# Patient Record
Sex: FEMALE | Race: WHITE | Employment: UNEMPLOYED | URBAN - NONMETROPOLITAN AREA
[De-identification: names, ages, dates, MRNs, and addresses within clinical notes are randomized per-mention and may not be internally consistent; named-entity substitution may affect disease eponyms.]

---

## 2017-01-26 ENCOUNTER — HOSPITAL ENCOUNTER (OUTPATIENT)
Dept: OTHER | Age: 42
Discharge: HOME | End: 2017-01-31
Attending: PREVENTIVE MEDICINE | Admitting: PREVENTIVE MEDICINE

## 2017-02-01 ENCOUNTER — HOSPITAL ENCOUNTER (OUTPATIENT)
Dept: OTHER | Age: 42
Discharge: HOME | End: 2017-02-28
Attending: PREVENTIVE MEDICINE | Admitting: PREVENTIVE MEDICINE

## 2021-10-08 ENCOUNTER — HOSPITAL ENCOUNTER (EMERGENCY)
Age: 46
Discharge: HOME | End: 2021-10-08
Attending: EMERGENCY MEDICINE
Payer: COMMERCIAL

## 2021-10-08 VITALS
HEIGHT: 68 IN | RESPIRATION RATE: 16 BRPM | SYSTOLIC BLOOD PRESSURE: 131 MMHG | BODY MASS INDEX: 24.25 KG/M2 | HEART RATE: 111 BPM | OXYGEN SATURATION: 100 % | WEIGHT: 160 LBS | DIASTOLIC BLOOD PRESSURE: 95 MMHG | TEMPERATURE: 97.8 F

## 2021-10-08 DIAGNOSIS — N72 CERVICITIS: ICD-10-CM

## 2021-10-08 DIAGNOSIS — R10.30 LOWER ABDOMINAL PAIN: ICD-10-CM

## 2021-10-08 DIAGNOSIS — Z71.1 CONCERN ABOUT STD IN FEMALE WITHOUT DIAGNOSIS: Primary | ICD-10-CM

## 2021-10-08 LAB
ALBUMIN SERPL-MCNC: 3.9 GM/DL (ref 3.4–5)
ALP BLD-CCNC: 73 IU/L (ref 40–129)
ALT SERPL-CCNC: 10 U/L (ref 10–40)
ANION GAP SERPL CALCULATED.3IONS-SCNC: 14 MMOL/L (ref 4–16)
AST SERPL-CCNC: 25 IU/L (ref 15–37)
BACTERIA: ABNORMAL /HPF
BASOPHILS ABSOLUTE: 0 K/CU MM
BASOPHILS RELATIVE PERCENT: 0.2 % (ref 0–1)
BILIRUB SERPL-MCNC: 0.5 MG/DL (ref 0–1)
BILIRUBIN DIRECT: 0.2 MG/DL (ref 0–0.3)
BILIRUBIN URINE: NEGATIVE MG/DL
BILIRUBIN, INDIRECT: 0.3 MG/DL (ref 0–0.7)
BLOOD, URINE: ABNORMAL
BUN BLDV-MCNC: 14 MG/DL (ref 6–23)
CALCIUM SERPL-MCNC: 9 MG/DL (ref 8.3–10.6)
CAST TYPE: ABNORMAL /HPF
CHLORIDE BLD-SCNC: 100 MMOL/L (ref 99–110)
CLARITY: ABNORMAL
CO2: 21 MMOL/L (ref 21–32)
COLOR: YELLOW
COMMENT UA: ABNORMAL
CREAT SERPL-MCNC: 0.8 MG/DL (ref 0.6–1.1)
CRYSTAL TYPE: NEGATIVE /HPF
DIFFERENTIAL TYPE: ABNORMAL
EOSINOPHILS ABSOLUTE: 0.1 K/CU MM
EOSINOPHILS RELATIVE PERCENT: 0.6 % (ref 0–3)
EPITHELIAL CELLS, UA: ABNORMAL /HPF
GFR AFRICAN AMERICAN: >60 ML/MIN/1.73M2
GFR NON-AFRICAN AMERICAN: >60 ML/MIN/1.73M2
GLUCOSE BLD-MCNC: 100 MG/DL (ref 70–99)
GLUCOSE, URINE: NEGATIVE MG/DL
HCT VFR BLD CALC: 36.2 % (ref 37–47)
HEMOGLOBIN: 10.7 GM/DL (ref 12.5–16)
IMMATURE NEUTROPHIL %: 0.5 % (ref 0–0.43)
INTERPRETATION: NORMAL
KETONES, URINE: 160 MG/DL
LEUKOCYTE ESTERASE, URINE: ABNORMAL
LIPASE: 27 IU/L (ref 13–60)
LYMPHOCYTES ABSOLUTE: 0.9 K/CU MM
LYMPHOCYTES RELATIVE PERCENT: 7.4 % (ref 24–44)
Lab: NORMAL
MCH RBC QN AUTO: 23.3 PG (ref 27–31)
MCHC RBC AUTO-ENTMCNC: 29.6 % (ref 32–36)
MCV RBC AUTO: 78.9 FL (ref 78–100)
MONOCYTES ABSOLUTE: 0.8 K/CU MM
MONOCYTES RELATIVE PERCENT: 6.9 % (ref 0–4)
NITRITE URINE, QUANTITATIVE: NEGATIVE
PDW BLD-RTO: 15.3 % (ref 11.7–14.9)
PH, URINE: 6 (ref 5–8)
PLATELET # BLD: 230 K/CU MM (ref 140–440)
PMV BLD AUTO: 10.5 FL (ref 7.5–11.1)
POTASSIUM SERPL-SCNC: 4.9 MMOL/L (ref 3.5–5.1)
PREGNANCY, URINE: NEGATIVE
PROTEIN UA: 100 MG/DL
RBC # BLD: 4.59 M/CU MM (ref 4.2–5.4)
RBC URINE: 1 /HPF (ref 0–6)
SEGMENTED NEUTROPHILS ABSOLUTE COUNT: 10.2 K/CU MM
SEGMENTED NEUTROPHILS RELATIVE PERCENT: 84.4 % (ref 36–66)
SODIUM BLD-SCNC: 135 MMOL/L (ref 135–145)
SPECIFIC GRAVITY UA: 1.02 (ref 1–1.03)
SPECIFIC GRAVITY, URINE: 1.02 (ref 1–1.03)
SPECIMEN: NORMAL
TOTAL IMMATURE NEUTOROPHIL: 0.06 K/CU MM
TOTAL PROTEIN: 7 GM/DL (ref 6.4–8.2)
UROBILINOGEN, URINE: 0.2 MG/DL (ref 0.2–1)
WBC # BLD: 12.1 K/CU MM (ref 4–10.5)
WBC UA: 20 /HPF (ref 0–5)
WET PREP: NORMAL

## 2021-10-08 PROCEDURE — 87491 CHLMYD TRACH DNA AMP PROBE: CPT

## 2021-10-08 PROCEDURE — 80053 COMPREHEN METABOLIC PANEL: CPT

## 2021-10-08 PROCEDURE — 6360000002 HC RX W HCPCS: Performed by: EMERGENCY MEDICINE

## 2021-10-08 PROCEDURE — 85025 COMPLETE CBC W/AUTO DIFF WBC: CPT

## 2021-10-08 PROCEDURE — 81001 URINALYSIS AUTO W/SCOPE: CPT

## 2021-10-08 PROCEDURE — 83690 ASSAY OF LIPASE: CPT

## 2021-10-08 PROCEDURE — 99283 EMERGENCY DEPT VISIT LOW MDM: CPT

## 2021-10-08 PROCEDURE — 96372 THER/PROPH/DIAG INJ SC/IM: CPT

## 2021-10-08 PROCEDURE — 81025 URINE PREGNANCY TEST: CPT

## 2021-10-08 PROCEDURE — 82248 BILIRUBIN DIRECT: CPT

## 2021-10-08 PROCEDURE — 87210 SMEAR WET MOUNT SALINE/INK: CPT

## 2021-10-08 PROCEDURE — 87591 N.GONORRHOEAE DNA AMP PROB: CPT

## 2021-10-08 PROCEDURE — 6370000000 HC RX 637 (ALT 250 FOR IP): Performed by: EMERGENCY MEDICINE

## 2021-10-08 RX ORDER — METRONIDAZOLE 250 MG/1
500 TABLET ORAL ONCE
Status: COMPLETED | OUTPATIENT
Start: 2021-10-08 | End: 2021-10-08

## 2021-10-08 RX ORDER — ONDANSETRON 2 MG/ML
4 INJECTION INTRAMUSCULAR; INTRAVENOUS ONCE
Status: DISCONTINUED | OUTPATIENT
Start: 2021-10-08 | End: 2021-10-08

## 2021-10-08 RX ORDER — ONDANSETRON 4 MG/1
4 TABLET, ORALLY DISINTEGRATING ORAL EVERY 8 HOURS PRN
Qty: 15 TABLET | Refills: 0 | Status: SHIPPED | OUTPATIENT
Start: 2021-10-08 | End: 2022-08-12

## 2021-10-08 RX ORDER — ONDANSETRON 4 MG/1
4 TABLET, ORALLY DISINTEGRATING ORAL ONCE
Status: COMPLETED | OUTPATIENT
Start: 2021-10-08 | End: 2021-10-08

## 2021-10-08 RX ORDER — HYDROCODONE BITARTRATE AND ACETAMINOPHEN 5; 325 MG/1; MG/1
1 TABLET ORAL ONCE
Status: COMPLETED | OUTPATIENT
Start: 2021-10-08 | End: 2021-10-08

## 2021-10-08 RX ORDER — DOXYCYCLINE HYCLATE 100 MG
100 TABLET ORAL ONCE
Status: COMPLETED | OUTPATIENT
Start: 2021-10-08 | End: 2021-10-08

## 2021-10-08 RX ORDER — METRONIDAZOLE 500 MG/1
500 TABLET ORAL 2 TIMES DAILY
Qty: 14 TABLET | Refills: 0 | Status: SHIPPED | OUTPATIENT
Start: 2021-10-08 | End: 2021-10-15

## 2021-10-08 RX ORDER — KETOROLAC TROMETHAMINE 30 MG/ML
30 INJECTION, SOLUTION INTRAMUSCULAR; INTRAVENOUS ONCE
Status: DISCONTINUED | OUTPATIENT
Start: 2021-10-08 | End: 2021-10-08

## 2021-10-08 RX ORDER — DOXYCYCLINE HYCLATE 100 MG
100 TABLET ORAL 2 TIMES DAILY
Qty: 14 TABLET | Refills: 0 | Status: SHIPPED | OUTPATIENT
Start: 2021-10-08 | End: 2021-10-15

## 2021-10-08 RX ORDER — CEFTRIAXONE 1 G/1
500 INJECTION, POWDER, FOR SOLUTION INTRAMUSCULAR; INTRAVENOUS ONCE
Status: COMPLETED | OUTPATIENT
Start: 2021-10-08 | End: 2021-10-08

## 2021-10-08 RX ORDER — IBUPROFEN 600 MG/1
600 TABLET ORAL 3 TIMES DAILY PRN
Qty: 30 TABLET | Refills: 0 | Status: SHIPPED | OUTPATIENT
Start: 2021-10-08 | End: 2022-08-12

## 2021-10-08 RX ORDER — MORPHINE SULFATE 4 MG/ML
4 INJECTION, SOLUTION INTRAMUSCULAR; INTRAVENOUS ONCE
Status: DISCONTINUED | OUTPATIENT
Start: 2021-10-08 | End: 2021-10-08

## 2021-10-08 RX ORDER — IBUPROFEN 400 MG/1
800 TABLET ORAL ONCE
Status: COMPLETED | OUTPATIENT
Start: 2021-10-08 | End: 2021-10-08

## 2021-10-08 RX ORDER — HYDROCODONE BITARTRATE AND ACETAMINOPHEN 5; 325 MG/1; MG/1
1 TABLET ORAL EVERY 6 HOURS PRN
Qty: 12 TABLET | Refills: 0 | Status: SHIPPED | OUTPATIENT
Start: 2021-10-08 | End: 2021-10-11

## 2021-10-08 RX ORDER — 0.9 % SODIUM CHLORIDE 0.9 %
1000 INTRAVENOUS SOLUTION INTRAVENOUS ONCE
Status: DISCONTINUED | OUTPATIENT
Start: 2021-10-08 | End: 2021-10-08

## 2021-10-08 RX ADMIN — IBUPROFEN 800 MG: 400 TABLET, FILM COATED ORAL at 12:10

## 2021-10-08 RX ADMIN — CEFTRIAXONE SODIUM 500 MG: 1 INJECTION, POWDER, FOR SOLUTION INTRAMUSCULAR; INTRAVENOUS at 13:31

## 2021-10-08 RX ADMIN — ONDANSETRON 4 MG: 4 TABLET, ORALLY DISINTEGRATING ORAL at 12:10

## 2021-10-08 RX ADMIN — HYDROCODONE BITARTRATE AND ACETAMINOPHEN 1 TABLET: 5; 325 TABLET ORAL at 13:42

## 2021-10-08 RX ADMIN — METRONIDAZOLE 500 MG: 250 TABLET ORAL at 13:31

## 2021-10-08 RX ADMIN — DOXYCYCLINE HYCLATE 100 MG: 100 TABLET, COATED ORAL at 13:31

## 2021-10-08 ASSESSMENT — PAIN SCALES - GENERAL: PAINLEVEL_OUTOF10: 7

## 2021-10-08 NOTE — ED NOTES
Pt presents to ED with c/o abd pain and dysuria that started yesterday. Pt states boyfriend recently cheated on her. Pt states she noticed some vaginal discharge this morning.      Kasia Lofton RN  10/08/21 1514

## 2021-10-08 NOTE — ED NOTES
Discharge instructions given with prescription verbalized understanding pt is ambulatory out       Reina Deleon RN  10/08/21 7496

## 2021-10-08 NOTE — ED PROVIDER NOTES
CHIEF COMPLAINT  Chief Complaint   Patient presents with    Abdominal Pain    Dysuria    Exposure to STD       HPI  Kimberly Oviedo is a 42 y.o. female with history of hypertension, CHF who presents lower abdominal pain that has worsened over the past 3 to 4 days but today is more severe, significant, well localized across bilateral lower quadrants. She is also having pelvic discharge and recently found out that her significant other cheated on her. She is having menstrual cycles, denies pregnancy. She is also having urinary frequency, no hematuria or history of renal calculi. Denies any measured fevers but has had some chills and fatigue. She has been taking over-the-counter medications intermittently with minimal improvement.   Last dose 6 hours prior to arrival.    REVIEW OF SYSTEMS  Review of Systems   History obtained from chart review and the patient  General ROS: positive for  - chills and fatigue  Ophthalmic ROS: negative for - decreased vision or double vision  ENT ROS: negative for - headaches  Hematological and Lymphatic ROS: negative for - bleeding problems or bruising  Endocrine ROS: negative for - unexpected weight changes  Respiratory ROS: no cough, shortness of breath, or wheezing  Cardiovascular ROS: no chest pain or dyspnea on exertion  Gastrointestinal ROS: positive for - abdominal pain  Genito-Urinary ROS: positive for -pelvic discharge  Musculoskeletal ROS: negative for - joint pain or joint stiffness  Neurological ROS: no TIA or stroke symptoms      PAST MEDICAL HISTORY  Past Medical History:   Diagnosis Date    CHF [facility]congest[facility] failure) ([facility] [address]     Depression     reports prior hx but no sx at present    Hypertension     diet controlled    Insomnia 5/7/2015       FAMILY HISTORY  Family History   Problem Relation Age of Onset    High Blood Pressure Mother     High Blood Pressure Father     High Blood Pressure Sister     Breast Cancer Neg Hx     Ovarian Cancer Neg Hx     Colon Cancer Neg Hx     Heart Disease Neg Hx        SOCIAL HISTORY  Social History     Socioeconomic History    Marital status: Single     Spouse name: Not on file    Number of children: Not on file    Years of education: Not on file    Highest education level: Not on file   Occupational History    Not on file   Tobacco Use    Smoking status: Current Every Day Smoker     Packs/day: 0.50     Types: Cigarettes    Smokeless tobacco: Never Used   Vaping Use    Vaping Use: Never used   Substance and Sexual Activity    Alcohol use: No     Alcohol/week: 0.0 standard drinks     Comment: rare    Drug use: No    Sexual activity: Not on file   Other Topics Concern    Not on file   Social History Narrative    Not on file     Social Determinants of Health     Financial Resource Strain:     Difficulty of Paying Living Expenses:    Food Insecurity:     Worried About Running Out of Food in the Last Year:     Ran Out of Food in the Last Year:    Transportation Needs:     Lack of Transportation (Medical):  Lack of Transportation (Non-Medical):    Physical Activity:     Days of Exercise per Week:     Minutes of Exercise per Session:    Stress:     Feeling of Stress :    Social Connections:     Frequency of Communication with Friends and Family:     Frequency of Social Gatherings with Friends and Family:     Attends Cheondo[employer] Services:     Active Member of Clubs or Organizations:     Attends Club or Organization Meetings:     Marital Status:    Intimate Partner Violence:     Fear of Current or Ex-Partner:     Emotionally Abused:     Physically Abused:     Sexually Abused:        SURGICAL HISTORY  Past Surgical History:   Procedure Laterality Date    [address]  No current facility-administered medications on file prior to encounter.      Current Outpatient Medications on File Prior to Encounter   Medication Sig Dispense Refill    clonazePAM (KLONOPIN) 0.5 MG tablet Take one tab in the morning and two tabs at night 90 tablet 2    cloNIDine (CATAPRES) 0.2 MG tablet Take 1 tablet by mouth 2 times daily 60 tablet 7    lisinopril (PRINIVIL;ZESTRIL) 10 MG tablet Take 1 tablet by mouth daily 30 tablet 7    amitriptyline (ELAVIL) 75 MG tablet Take 1 tablet by mouth nightly 30 tablet 5    albuterol (VENTOLIN HFA) 108 (90 BASE) MCG/ACT inhaler Inhale 2 puffs into the lungs every 6 hours as needed for Wheezing 1 Inhaler 3         ALLERGIES  No Known Allergies    PHYSICAL EXAM  VITAL SIGNS: BP (!) 131/95   Pulse 111 Comment: pt crying during vital signs measument  Temp 97.8 °F (36.6 °C) (Infrared)   Resp 16   Ht 5' 8\" (1.727 m)   Wt 160 lb (72.6 kg)   SpO2 100%   BMI 24.33 kg/m²   Constitutional: Well developed, Well nourished, resting in bed, appears uncomfortable  HENT: Normocephalic, Atraumatic, Bilateral external ears normal, Oropharynx moist, No oral exudates, Nose normal.   Eyes: PERRL, EOMI, Conjunctiva normal, No discharge. Neck: Normal range of motion, Supple, No stridor. Cardiovascular: Tachycardic heart rate, Normal rhythm, No murmurs, No rubs, No gallops. Thorax & Lungs: Normal breath sounds, No respiratory distress, No wheezing, No chest tenderness. Abdomen: Bowel sounds normal, Soft, bilateral lower and suprapubic tenderness, intermittent voluntary lower guarding, no rebound, No masses, No pulsatile masses. Skin: Warm, Dry, No erythema, No rash. Extremities: Intact distal pulses, No edema, No tenderness, No cyanosis, No clubbing. Musculoskeletal: Good gross range of motion in all major joints. No major deformities noted. Neurologic: Alert & oriented x 3, Normal gross motor function, Normal gross sensory function, No focal deficits noted. Psychiatric: Affect normal  : Performed in conjunction with nursing chaperone.   Normal external genitalia, copious yellow/white discharge at the cervical os. Positive CMT and adnexal tenderness. RADIOLOGY/PROCEDURES/LABS      Labs Reviewed   CBC WITH AUTO DIFFERENTIAL - Abnormal; Notable for the following components:       Result Value    WBC 12.1 (*)     Hemoglobin 10.7 (*)     Hematocrit 36.2 (*)     MCH 23.3 (*)     MCHC 29.6 (*)     RDW 15.3 (*)     Segs Relative 84.4 (*)     Lymphocytes % 7.4 (*)     Monocytes % 6.9 (*)     Immature Neutrophil % 0.5 (*)     All other components within normal limits   BASIC METABOLIC PANEL W/ REFLEX TO MG FOR LOW K - Abnormal; Notable for the following components:    Glucose 100 (*)     All other components within normal limits   URINALYSIS - Abnormal; Notable for the following components:    Clarity, UA CLOUDY (*)     Ketones, Urine 160 (*)     Blood, Urine TRACE (*)     Protein,  (*)     Leukocyte Esterase, Urine MODERATE (*)     WBC, UA 20 (*)     Bacteria, UA FEW (*)     All other components within normal limits   WET PREP, GENITAL   C.TRACHOMATIS N.GONORRHOEAE DNA   HEPATIC FUNCTION PANEL   LIPASE   PREGNANCY, URINE     UA likely contaminated secondary to copious vaginal secretions, explaining clue cells    Medications   ondansetron (ZOFRAN-ODT) disintegrating tablet 4 mg (4 mg Oral Given 10/8/21 1210)   ibuprofen (ADVIL;MOTRIN) tablet 800 mg (800 mg Oral Given 10/8/21 1210)   doxycycline hyclate (VIBRA-TABS) tablet 100 mg (100 mg Oral Given 10/8/21 1331)   cefTRIAXone (ROCEPHIN) injection 500 mg (500 mg IntraMUSCular Given 10/8/21 1331)   metroNIDAZOLE (FLAGYL) tablet 500 mg (500 mg Oral Given 10/8/21 1331)   HYDROcodone-acetaminophen (NORCO) 5-325 MG per tablet 1 tablet (1 tablet Oral Given 10/8/21 1342)       COURSE & MEDICAL DECISION MAKING  Pertinent Labs & Imaging studies reviewed. (See chart for details)    43-year-old female presents with vaginal discharge, lower abdominal pain.   I have concern that she may have PID secondary to the suggestion of cervicitis with lower abdominal pain. Patient states that she is always tachycardic and that her normal heart rate is in the 100s or 110s. Here she is tachycardic but states that her normal.  She is afebrile, tolerating p.o. Her pain was managed with ibuprofen after Zofran. She had continued pain and Norco was given. She will be treated for PID and STDs with Rocephin, doxycycline and Flagyl. The doxycycline and Flagyl to continue as an outpatient. She is agreeable plan of care, continued nausea medication recommended. She will be referred to gynecology. Very strict return cautions put in place secondary to her degree of illness and persistent tachycardia. Lower clinical suspicion for appendicitis, colitis, diverticulitis, ectopic pregnancy, ovarian torsion, pyelonephritis as cause of signs and symptoms. FINAL IMPRESSION  Problem List Items Addressed This Visit     None      Visit Diagnoses     Concern about STD in female without diagnosis    -  Primary    Cervicitis        Relevant Medications    HYDROcodone-acetaminophen (NORCO) 5-325 MG per tablet    Lower abdominal pain          1.    2.   3.    Patient gave me permission to discuss medical history, care, and plan with those present in the room.   Electronically signed by: Tejas Pradhan MD, 10/8/2021  MD Tejas Melgar MD  10/08/21 5334

## 2021-10-08 NOTE — Clinical Note
Tejas Louis was seen and treated in our emergency department on 10/8/2021. She may return to work on 10/11/2021. If you have any questions or concerns, please don't hesitate to call.       Evelia Herrera MD

## 2021-10-08 NOTE — ED NOTES
In room to attempt IV start pt refusing IV at this time states she isnt nauseated and doesn't want pain medication at this time .  Pt refused IV placement notified Dr Anna Munguia notify Dr Blanca Aguillon, RN  10/08/21 4495 [facility], RN  10/08/21 3516

## 2021-10-14 LAB
CHLAMYDIA TRACHOMATIS AMPLIFIED DET: NEGATIVE
N GONORRHOEAE AMPLIFIED DET: NEGATIVE

## 2022-08-12 ENCOUNTER — HOSPITAL ENCOUNTER (EMERGENCY)
Age: 47
Discharge: HOME | End: 2022-08-12
Attending: EMERGENCY MEDICINE
Payer: COMMERCIAL

## 2022-08-12 VITALS
HEIGHT: 67 IN | BODY MASS INDEX: 25.9 KG/M2 | DIASTOLIC BLOOD PRESSURE: 78 MMHG | SYSTOLIC BLOOD PRESSURE: 138 MMHG | HEART RATE: 100 BPM | TEMPERATURE: 98.1 F | OXYGEN SATURATION: 98 % | RESPIRATION RATE: 20 BRPM | WEIGHT: 165 LBS

## 2022-08-12 DIAGNOSIS — U07.1 COVID-19: Primary | ICD-10-CM

## 2022-08-12 LAB
SARS-COV-2, NAAT: DETECTED
SOURCE: ABNORMAL

## 2022-08-12 PROCEDURE — 99283 EMERGENCY DEPT VISIT LOW MDM: CPT

## 2022-08-12 PROCEDURE — 87635 SARS-COV-2 COVID-19 AMP PRB: CPT

## 2022-08-12 PROCEDURE — 6370000000 HC RX 637 (ALT 250 FOR IP): Performed by: EMERGENCY MEDICINE

## 2022-08-12 RX ORDER — IBUPROFEN 400 MG/1
600 TABLET ORAL ONCE
Status: COMPLETED | OUTPATIENT
Start: 2022-08-12 | End: 2022-08-12

## 2022-08-12 RX ADMIN — IBUPROFEN 600 MG: 400 TABLET, FILM COATED ORAL at 02:01

## 2022-08-12 ASSESSMENT — ENCOUNTER SYMPTOMS
RHINORRHEA: 0
VOMITING: 0
SHORTNESS OF BREATH: 0
NAUSEA: 0
BACK PAIN: 0
ABDOMINAL PAIN: 0
COUGH: 0
SORE THROAT: 0
EYE DISCHARGE: 0
EYE PAIN: 0

## 2022-08-12 NOTE — ED NOTES
Patient prepared for and ready to be discharged. Patient discharged at this time in no acute distress after verbalizing understanding of discharge instructions. Patient left after receiving After Visit Summary instructions.        Carolyn Huang RN  08/12/22 6301

## 2022-08-12 NOTE — ED PROVIDER NOTES
[id number]  [facility]      Pt Name: Kimberly Oviedo  MRN: [id number]  [place] 1975  Date of evaluation: 8/12/2022  Provider: Jerel Sanches MD    CHIEF COMPLAINT       Chief Complaint   Patient presents with    Concern For COVID-19     Sore throat, body aches, headache, cough, States her boyfriends niece tested positive for [address]      Kimberly Oviedo is a 41 y.o. female who presents to the emergency department  for   Chief Complaint   Patient presents with    Concern For COVID-19     Sore throat, body aches, headache, cough, States her boyfriends niece tested positive for COVID        72-year-old female presents with concern for Mable-19 infection. She endorses having a high risk exposure. She has not been vaccinated or boosted for COVID-19. She reports that someone in the location where she is living recently tested positive with a home test.  She has had 2 days of some myalgias and chills. Denies any difficulty breathing. Denies any fevers. She is not having any nausea or vomiting. No diarrhea. She presents the emergency department without any signs of respiratory distress. GCS of 15. Nursing Notes, Triage Notes & Vital Signs were reviewed. REVIEW OF SYSTEMS    (2-9 systems for level 4, 10 or more for level 5)     Review of Systems   Constitutional:  Positive for chills. Negative for fever. HENT:  Negative for congestion, rhinorrhea and sore throat. Eyes:  Negative for pain and discharge. Respiratory:  Negative for cough and shortness of breath. Cardiovascular:  Negative for chest pain and palpitations. Gastrointestinal:  Negative for abdominal pain, nausea and vomiting. Endocrine: Negative for polydipsia and polyuria. Genitourinary:  Negative for dysuria and flank pain. Musculoskeletal:  Negative for back pain and neck pain. Skin:  Negative for pallor and wound.    Neurological: Negative for dizziness, facial asymmetry, light-headedness, numbness and headaches. Psychiatric/Behavioral:  Negative for confusion. Except as noted above the remainder of the review of systems was reviewed and negative. PAST MEDICAL HISTORY     Past Medical History:   Diagnosis Date    CHF (congestive heart failure) ([facility] [address]     Depression     reports prior hx but no sx at present    Hypertension     diet controlled    Insomnia 2015    Kidney stone        Prior to Admission medications    Medication Sig Start Date End Date Taking? Authorizing Provider   cloNIDine (CATAPRES) 0.2 MG tablet Take 1 tablet by mouth 2 times daily  Patient not taking: Reported on 16   Leretha Ax, DO   lisinopril (PRINIVIL;ZESTRIL) 10 MG tablet Take 1 tablet by mouth daily 16   Leretha Ax, DO        Patient Active Problem List   Diagnosis    Insomnia    Shoulder pain    Depression    Hypertension    Nevus of face    Actinic keratosis    Smoker    History of chest pain    Anxiety         SURGICAL HISTORY       Past Surgical History:   Procedure Laterality Date    CARPAL TUNNEL RELEASE       SECTION      TONSILLECTOMY AND ADENOIDECTOMY      TUBAL LIGATION           CURRENT MEDICATIONS       Discharge Medication List as of 2022  2:19 AM        CONTINUE these medications which have NOT CHANGED    Details   cloNIDine (CATAPRES) 0.2 MG tablet Take 1 tablet by mouth 2 times daily, Disp-60 tablet, R-7      lisinopril (PRINIVIL;ZESTRIL) 10 MG tablet Take 1 tablet by mouth daily, Disp-30 tablet, R-7             ALLERGIES     Patient has no known allergies.     FAMILY HISTORY       Family History   Problem Relation Age of Onset    High Blood Pressure Mother     High Blood Pressure Father     High Blood Pressure Sister     Breast Cancer Neg Hx     Ovarian Cancer Neg Hx     Colon Cancer Neg Hx     Heart Disease Neg Hx           SOCIAL HISTORY       Social History     Socioeconomic History    Marital status: Single   Tobacco Use    Smoking status: Every Day     Packs/day: 0.50     Types: Cigarettes    Smokeless tobacco: Never   Vaping Use    Vaping Use: Never used   Substance and Sexual Activity    Alcohol use: No     Alcohol/week: 0.0 standard drinks     Comment: rare    Drug use: No    Sexual activity: Yes     Partners: Male       SCREENINGS    [place] Coma Scale  Eye Opening: Spontaneous  Best Verbal Response: Oriented  Best Motor Response: Obeys commands  [place] Coma Scale Score: 15          PHYSICAL EXAM    (up to 7 for level 4, 8 or more for level 5)     ED Triage Vitals   BP Temp Temp Source Heart Rate Resp SpO2 Height Weight   08/12/22 0145 08/12/22 0139 08/12/22 0139 08/12/22 0145 08/12/22 0145 08/12/22 0145 08/12/22 0145 08/12/22 0145   138/78 98.1 °F (36.7 °C) Axillary 100 20 98 % 5' 7\" (1.702 m) 165 lb (74.8 kg)       Physical Exam  Vitals reviewed. Constitutional:       Appearance: She is not ill-appearing or toxic-appearing. HENT:      Head: Normocephalic and atraumatic. Nose: No congestion or rhinorrhea. Mouth/Throat:      Mouth: Mucous membranes are moist.      Pharynx: No oropharyngeal exudate. Eyes:      General:         Right eye: No discharge. Left eye: No discharge. Extraocular Movements: Extraocular movements intact. Pupils: Pupils are equal, round, and reactive to light. Cardiovascular:      Rate and Rhythm: Normal rate. Pulses: Normal pulses. Heart sounds: No friction rub. No gallop. Pulmonary:      Effort: Pulmonary effort is normal.      Breath sounds: No stridor. No wheezing. Abdominal:      Palpations: Abdomen is soft. Tenderness: There is no abdominal tenderness. There is no guarding. Musculoskeletal:         General: No swelling or tenderness. Normal range of motion. Cervical back: Normal range of motion and neck supple. No tenderness. Lymphadenopathy:      Cervical: No cervical adenopathy. Skin:     General: Skin is warm. Capillary Refill: Capillary refill takes less than 2 seconds. Neurological:      General: No focal deficit present. Mental Status: She is alert. DIAGNOSTIC RESULTS     Labs Reviewed   COVID-19, RAPID - Abnormal; Notable for the following components:       Result Value    SARS-CoV-2, NAAT DETECTED (*)     All other components within normal limits        RADIOLOGY:     Non-plain film images such as CT, Ultrasound and MRI are read by the radiologist. Plain radiographic images are visualized and preliminarily interpreted by the emergency physician. Interpretation per the Radiologist below, if available at the time of this note:    No orders to display         ED BEDSIDE ULTRASOUND:   Performed by ED Physician Javed Bañuelos MD       LABS:  Labs Reviewed   COVID-19, RAPID - Abnormal; Notable for the following components:       Result Value    SARS-CoV-2, NAAT DETECTED (*)     All other components within normal limits       All other labs were within normal range or not returned as of this dictation. EMERGENCY DEPARTMENT COURSE and DIFFERENTIAL DIAGNOSIS/MDM:   Vitals:    Vitals:    08/12/22 0139 08/12/22 0145   BP:  138/78   Pulse:  100   Resp:  20   Temp: 98.1 °F (36.7 °C)    TempSrc: Axillary    SpO2:  98%   Weight:  165 lb (74.8 kg)   Height:  5' 7\" (1.702 m)           MDM  Number of Diagnoses or Management Options  COVID-19  Diagnosis management comments: 77-year-old female presents with concern for Mable-19 infection. She is currently living in a home where someone also tested positive for the virus. She has had 2 days of some myalgias and chills. Denies any difficulty breathing. She presents with her unremarkable vital signs. She is afebrile. Respirations are within normal limits. Ox saturations are in the high 90s on room air. Blood pressure within normal limits. Her lungs with auscultation bilaterally. No signs respiratory distress.   We did take a rapid COVID test and it is positive. She has not been vaccinated nor posted for the disease. She does not meet any clinical criteria for admission. We did discuss outpatient management. She will undertake symptomatic measures at home. She will watch her respiratory status closely. We did discuss monitoring her pulse ox for any signs of deterioration. She is amatory without difficulty. She is discharged in stable condition with return precautions. -  Patient seen and evaluated in the emergency department. -  Triage and nursing notes reviewed and incorporated. -  Old chart records reviewed and incorporated. -  Work-up included:  See above  -  Results discussed with patient. CONSULTS:  None    PROCEDURES:  None performed unless otherwise noted below     Procedures        FINAL IMPRESSION      1. COVID-19          DISPOSITION/PLAN   DISPOSITION Decision To Discharge 08/12/2022 02:18:07 AM      PATIENT REFERRED TO:  Hayley Arceo [address] [place] [address]  [phone]      As needed    DISCHARGE MEDICATIONS:  Discharge Medication List as of 8/12/2022  2:19 AM          ED Provider Disposition Time  DISPOSITION Decision To Discharge 08/12/2022 02:18:07 AM      Appropriate personal protective equipment was worn during the patient's evaluation. These included surgical, eye protection, surgical mask or in 95 respirator and gloves. The patient was also placed in a surgical mask for the prevention of possible spread of respiratory viral illnesses. The Patient was instructed to read the package inserts with any medication that was prescribed. Major potential reactions and medication interactions were discussed. The Patient understands that there are numerous possible adverse reactions not covered.     The patient was also instructed to arrange follow-up with his or her primary care provider for review of any pending labwork or incidental findings on any radiology results that were obtained. All efforts were made to discuss any incidental findings that require further monitoring. Controlled Substances Monitoring:     RX Monitoring 11/29/2016   Attestation The Prescription Monitoring Report for this patient was reviewed today. Periodic Controlled Substance Monitoring No signs of potential drug abuse or diversion identified. ;Medication contract signed today. ;Random urine drug screen sent today.        (Please note that portions of this note were completed with a voice recognition program.  Efforts were made to edit the dictations but occasionally words are mis-transcribed.)    David Quinones MD (electronically signed)  Attending Emergency Physician            David Quinones MD  08/12/22 7466

## 2022-08-12 NOTE — DISCHARGE INSTRUCTIONS
Your COVID-19 test today was positive. Please use symptomatic measures like Tylenol and ibuprofen and any over-the-counter formulations to help with any symptoms. Please watch your respiratory status. If you develop any significant difficulty with breathing please seek immediate medical evaluation. Please follow-up with primary care physician as needed.

## 2022-11-17 ENCOUNTER — HOSPITAL ENCOUNTER (EMERGENCY)
Age: 47
Discharge: HOME | End: 2022-11-17
Attending: EMERGENCY MEDICINE
Payer: COMMERCIAL

## 2022-11-17 ENCOUNTER — APPOINTMENT (OUTPATIENT)
Dept: CT IMAGING | Age: 47
End: 2022-11-17
Payer: COMMERCIAL

## 2022-11-17 VITALS
TEMPERATURE: 98.4 F | HEART RATE: 99 BPM | DIASTOLIC BLOOD PRESSURE: 78 MMHG | BODY MASS INDEX: 26.52 KG/M2 | HEIGHT: 68 IN | OXYGEN SATURATION: 97 % | RESPIRATION RATE: 14 BRPM | WEIGHT: 175 LBS | SYSTOLIC BLOOD PRESSURE: 150 MMHG

## 2022-11-17 DIAGNOSIS — N83.202 HEMORRHAGIC CYST OF LEFT OVARY: Primary | ICD-10-CM

## 2022-11-17 LAB
ALBUMIN SERPL-MCNC: 3.6 GM/DL (ref 3.4–5)
ALP BLD-CCNC: 81 IU/L (ref 40–129)
ALT SERPL-CCNC: 10 U/L (ref 10–40)
ANION GAP SERPL CALCULATED.3IONS-SCNC: 12 MMOL/L (ref 4–16)
AST SERPL-CCNC: 13 IU/L (ref 15–37)
BASOPHILS ABSOLUTE: 0 K/CU MM
BASOPHILS RELATIVE PERCENT: 0.3 % (ref 0–1)
BILIRUB SERPL-MCNC: 0.2 MG/DL (ref 0–1)
BILIRUBIN URINE: NEGATIVE MG/DL
BLOOD, URINE: NEGATIVE
BUN BLDV-MCNC: 10 MG/DL (ref 6–23)
CALCIUM SERPL-MCNC: 9.2 MG/DL (ref 8.3–10.6)
CHLORIDE BLD-SCNC: 99 MMOL/L (ref 99–110)
CLARITY: CLEAR
CO2: 25 MMOL/L (ref 21–32)
COLOR: YELLOW
CREAT SERPL-MCNC: 0.8 MG/DL (ref 0.6–1.1)
DIFFERENTIAL TYPE: ABNORMAL
EOSINOPHILS ABSOLUTE: 0.1 K/CU MM
EOSINOPHILS RELATIVE PERCENT: 1.3 % (ref 0–3)
GFR SERPL CREATININE-BSD FRML MDRD: >60 ML/MIN/1.73M2
GLUCOSE BLD-MCNC: 129 MG/DL (ref 70–99)
GLUCOSE, URINE: NEGATIVE MG/DL
HCT VFR BLD CALC: 32.3 % (ref 37–47)
HEMOGLOBIN: 9.8 GM/DL (ref 12.5–16)
IMMATURE NEUTROPHIL %: 0.3 % (ref 0–0.43)
INTERPRETATION: NORMAL
KETONES, URINE: NEGATIVE MG/DL
LACTATE: 1.1 MMOL/L (ref 0.4–2)
LEUKOCYTE ESTERASE, URINE: NEGATIVE
LYMPHOCYTES ABSOLUTE: 1.3 K/CU MM
LYMPHOCYTES RELATIVE PERCENT: 12.2 % (ref 24–44)
MCH RBC QN AUTO: 23.9 PG (ref 27–31)
MCHC RBC AUTO-ENTMCNC: 30.3 % (ref 32–36)
MCV RBC AUTO: 78.8 FL (ref 78–100)
MONOCYTES ABSOLUTE: 0.9 K/CU MM
MONOCYTES RELATIVE PERCENT: 8.3 % (ref 0–4)
NITRITE URINE, QUANTITATIVE: NEGATIVE
PDW BLD-RTO: 14 % (ref 11.7–14.9)
PH, URINE: 6 (ref 5–8)
PLATELET # BLD: 341 K/CU MM (ref 140–440)
PMV BLD AUTO: 10 FL (ref 7.5–11.1)
POTASSIUM SERPL-SCNC: 3.8 MMOL/L (ref 3.5–5.1)
PREGNANCY, URINE: NEGATIVE
PROTEIN UA: NEGATIVE MG/DL
RBC # BLD: 4.1 M/CU MM (ref 4.2–5.4)
SEGMENTED NEUTROPHILS ABSOLUTE COUNT: 8.6 K/CU MM
SEGMENTED NEUTROPHILS RELATIVE PERCENT: 77.6 % (ref 36–66)
SODIUM BLD-SCNC: 136 MMOL/L (ref 135–145)
SPECIFIC GRAVITY UA: 1.02 (ref 1–1.03)
SPECIFIC GRAVITY, URINE: 1.02 (ref 1–1.03)
TOTAL IMMATURE NEUTOROPHIL: 0.03 K/CU MM
TOTAL PROTEIN: 7.5 GM/DL (ref 6.4–8.2)
UROBILINOGEN, URINE: 0.2 MG/DL (ref 0.2–1)
WBC # BLD: 11 K/CU MM (ref 4–10.5)

## 2022-11-17 PROCEDURE — 99285 EMERGENCY DEPT VISIT HI MDM: CPT

## 2022-11-17 PROCEDURE — 85025 COMPLETE CBC W/AUTO DIFF WBC: CPT

## 2022-11-17 PROCEDURE — 6360000002 HC RX W HCPCS: Performed by: EMERGENCY MEDICINE

## 2022-11-17 PROCEDURE — 2580000003 HC RX 258: Performed by: EMERGENCY MEDICINE

## 2022-11-17 PROCEDURE — 96374 THER/PROPH/DIAG INJ IV PUSH: CPT

## 2022-11-17 PROCEDURE — 83605 ASSAY OF LACTIC ACID: CPT

## 2022-11-17 PROCEDURE — 81025 URINE PREGNANCY TEST: CPT

## 2022-11-17 PROCEDURE — 81003 URINALYSIS AUTO W/O SCOPE: CPT

## 2022-11-17 PROCEDURE — 80053 COMPREHEN METABOLIC PANEL: CPT

## 2022-11-17 PROCEDURE — 6360000004 HC RX CONTRAST MEDICATION: Performed by: EMERGENCY MEDICINE

## 2022-11-17 PROCEDURE — 96361 HYDRATE IV INFUSION ADD-ON: CPT

## 2022-11-17 PROCEDURE — 74177 CT ABD & PELVIS W/CONTRAST: CPT

## 2022-11-17 RX ORDER — ONDANSETRON 4 MG/1
4 TABLET, ORALLY DISINTEGRATING ORAL 3 TIMES DAILY PRN
Qty: 21 TABLET | Refills: 0 | Status: SHIPPED | OUTPATIENT
Start: 2022-11-17

## 2022-11-17 RX ORDER — 0.9 % SODIUM CHLORIDE 0.9 %
1000 INTRAVENOUS SOLUTION INTRAVENOUS ONCE
Status: COMPLETED | OUTPATIENT
Start: 2022-11-17 | End: 2022-11-17

## 2022-11-17 RX ORDER — OXYCODONE HYDROCHLORIDE AND ACETAMINOPHEN 5; 325 MG/1; MG/1
1 TABLET ORAL EVERY 6 HOURS PRN
Qty: 8 TABLET | Refills: 0 | Status: SHIPPED | OUTPATIENT
Start: 2022-11-17 | End: 2022-11-20

## 2022-11-17 RX ORDER — KETOROLAC TROMETHAMINE 30 MG/ML
30 INJECTION, SOLUTION INTRAMUSCULAR; INTRAVENOUS ONCE
Status: COMPLETED | OUTPATIENT
Start: 2022-11-17 | End: 2022-11-17

## 2022-11-17 RX ADMIN — KETOROLAC TROMETHAMINE 30 MG: 30 INJECTION, SOLUTION INTRAMUSCULAR; INTRAVENOUS at 19:35

## 2022-11-17 RX ADMIN — SODIUM CHLORIDE 1000 ML: 9 INJECTION, SOLUTION INTRAVENOUS at 19:33

## 2022-11-17 RX ADMIN — IOPAMIDOL 75 ML: 755 INJECTION, SOLUTION INTRAVENOUS at 20:29

## 2022-11-17 ASSESSMENT — PAIN - FUNCTIONAL ASSESSMENT: PAIN_FUNCTIONAL_ASSESSMENT: 0-10

## 2022-11-17 ASSESSMENT — PAIN DESCRIPTION - PAIN TYPE: TYPE: ACUTE PAIN

## 2022-11-17 ASSESSMENT — PAIN DESCRIPTION - FREQUENCY: FREQUENCY: INTERMITTENT

## 2022-11-17 ASSESSMENT — PAIN DESCRIPTION - ONSET: ONSET: ON-GOING

## 2022-11-17 ASSESSMENT — PAIN DESCRIPTION - LOCATION: LOCATION: ABDOMEN

## 2022-11-17 ASSESSMENT — PAIN DESCRIPTION - ORIENTATION: ORIENTATION: LEFT;LOWER

## 2022-11-17 ASSESSMENT — PAIN SCALES - GENERAL: PAINLEVEL_OUTOF10: 7

## 2022-11-18 NOTE — ED PROVIDER NOTES
Triage Chief Complaint:   Abdominal Pain (Pt c/o LLQ abd pain that radiates into Lt flank x4 days. Pt reports dysuria as well. )      [place]:  Yung Bello is a 41 y.o. female that presents to the emergency department with left lower quadrant abdominal pain. This has been going on for the last 4 days. Yesterday she started having some pain with urination. She denies vaginal bleeding or discharge. She states that she has had some irregular periods recently and was concerned she might be going through menopause but is not sure. She has had a tubal ligation. She has a history of congestive heart failure. She denies diarrhea, constipation, vomiting. Nothing seems to make her pain better or worse. She states that sharp and intermittent in nature. She does not recall ever having pain like this before. She denies chest pain or shortness of breath. She does feel a little hot right now but states her temperature was normal when she got here. .    Past Medical History:   Diagnosis Date    CHF (congestive heart failure) ([facility] [address]     Depression     reports prior hx but no sx at present    Hypertension     diet controlled    Insomnia 2015    Kidney stone      Past Surgical History:   Procedure Laterality Date    CARPAL TUNNEL RELEASE       SECTION      TONSILLECTOMY AND ADENOIDECTOMY      TUBAL LIGATION       Family History   Problem Relation Age of Onset    High Blood Pressure Mother     High Blood Pressure Father     High Blood Pressure Sister     Breast Cancer Neg Hx     Ovarian Cancer Neg Hx     Colon Cancer Neg Hx     Heart Disease Neg Hx      Social History     Socioeconomic History    Marital status: Single     Spouse name: Not on file    Number of children: Not on file    Years of education: Not on file    Highest education level: Not on file   Occupational History    Not on file   Tobacco Use    Smoking status: Every Day     Packs/day: 0.50     Types: Cigarettes    Smokeless tobacco: Never Vaping Use    Vaping Use: Never used   Substance and Sexual Activity    Alcohol use: No     Alcohol/week: 0.0 standard drinks     Comment: rare    Drug use: No    Sexual activity: Yes     Partners: Male   Other Topics Concern    Not on file   Social History Narrative    Not on file     Social Determinants of Health     Financial Resource Strain: Not on file   Food Insecurity: Not on file   Transportation Needs: Not on file   Physical Activity: Not on file   Stress: Not on file   Social Connections: Not on file   Intimate Partner Violence: Not on file   Housing Stability: Not on file     No current facility-administered medications for this encounter. Current Outpatient Medications   Medication Sig Dispense Refill    cloNIDine (CATAPRES) 0.2 MG tablet Take 1 tablet by mouth 2 times daily (Patient not taking: Reported on 8/12/2022) 60 tablet 7    lisinopril (PRINIVIL;ZESTRIL) 10 MG tablet Take 1 tablet by mouth daily 30 tablet 7     No Known Allergies  Nursing Notes Reviewed    ROS:  At least 10 systems reviewed and otherwise negative except as in the [address] Physical Exam:  ED Triage Vitals [11/17/22 1824]   Enc Vitals Group      BP (!) 159/91      Heart Rate (!) 120      Resp 18      Temp 97 °F (36.1 °C)      Temp Source Oral      SpO2 100 %      Weight 175 lb (79.4 kg)      Height 5' 8\" (1.727 m)      Head Circumference       Peak Flow       Pain Score       Pain Loc       Pain Edu? Excl. in [address]? My pulse oximetry interpretation is which is within the normal range    GENERAL APPEARANCE: Awake and alert. Cooperative. No acute distress. HEAD:  Atraumatic. EYES: EOM's grossly intact. ENT: Mucous membranes are moist.  No trismus. NECK:  Trachea midline. HEART: Tachycardia. Radial pulses 2+. LUNGS: Respirations unlabored. CTAB  ABDOMEN: Soft. Left lower quadrant abdominal tenderness palpation without guarding or rebound.   Mild right lower quadrant abdominal tenderness palpation and suprapubic abdominal tenderness palpation. Abdomen nondistended. EXTREMITIES: No acute deformities. SKIN: Warm and dry. NEUROLOGICAL: No gross facial drooping. Moves all 4 extremities spontaneously. PSYCHIATRIC: Normal mood.     I have reviewed and interpreted all of the currently available lab results from this visit (if applicable):  Results for orders placed or performed during the hospital encounter of 11/17/22   Urinalysis with Reflex to Culture    Specimen: Urine   Result Value Ref Range    Color, UA YELLOW YELLOW    Clarity, UA CLEAR CLEAR    Glucose, Urine NEGATIVE NEGATIVE MG/DL    Bilirubin Urine NEGATIVE NEGATIVE MG/DL    Ketones, Urine NEGATIVE NEGATIVE MG/DL    Specific Gravity, UA 1.025 1.001 - 1.035    Blood, Urine NEGATIVE NEGATIVE    pH, Urine 6.0 5.0 - 8.0    Protein, UA NEGATIVE NEGATIVE MG/DL    Urobilinogen, Urine 0.2 0.2 - 1.0 MG/DL    Nitrite Urine, Quantitative NEGATIVE NEGATIVE    Leukocyte Esterase, Urine NEGATIVE NEGATIVE   CMP   Result Value Ref Range    Sodium 136 135 - 145 MMOL/L    Potassium 3.8 3.5 - 5.1 MMOL/L    Chloride 99 99 - 110 mMol/L    CO2 25 21 - 32 MMOL/L    BUN 10 6 - 23 MG/DL    Creatinine 0.8 0.6 - 1.1 MG/DL    Est, Glom Filt Rate >60 >60 mL/min/1.73m2    Glucose 129 (H) 70 - 99 MG/DL    Calcium 9.2 8.3 - 10.6 MG/DL    Albumin 3.6 3.4 - 5.0 GM/DL    Total Protein 7.5 6.4 - 8.2 GM/DL    Total Bilirubin 0.2 0.0 - 1.0 MG/DL    ALT 10 10 - 40 U/L    AST 13 (L) 15 - 37 IU/L    Alkaline Phosphatase 81 40 - 129 IU/L    Anion Gap 12 4 - 16   CBC with Auto Differential   Result Value Ref Range    WBC 11.0 (H) 4.0 - 10.5 K/CU MM    RBC 4.10 (L) 4.2 - 5.4 M/CU MM    Hemoglobin 9.8 (L) 12.5 - 16.0 GM/DL    Hematocrit 32.3 (L) 37 - 47 %    MCV 78.8 78 - 100 FL    MCH 23.9 (L) 27 - 31 PG    MCHC 30.3 (L) 32.0 - 36.0 %    RDW 14.0 11.7 - 14.9 %    Platelets 928 281 - 398 K/CU MM    MPV 10.0 7.5 - 11.1 FL    Differential Type AUTOMATED DIFFERENTIAL     Segs Relative 77.6 (H) 36 - 66 % Lymphocytes % 12.2 (L) 24 - 44 %    Monocytes % 8.3 (H) 0 - 4 %    Eosinophils % 1.3 0 - 3 %    Basophils % 0.3 0 - 1 %    Segs Absolute 8.6 K/CU MM    Lymphocytes Absolute 1.3 K/CU MM    Monocytes Absolute 0.9 K/CU MM    Eosinophils Absolute 0.1 K/CU MM    Basophils Absolute 0.0 K/CU MM    Immature Neutrophil % 0.3 0 - 0.43 %    Total Immature Neutrophil 0.03 K/CU MM   Pregnancy, Urine   Result Value Ref Range    Pregnancy, Urine NEGATIVE NEGATIVE    Specific Gravity, Urine 1.020 1.001 - 1.035    Interpretation HCG METHOD LIMITATIONS:           EKG: (All EKG's are interpreted by myself in the absence of a cardiologist)      MDM:  Patient initially was tachycardic. After IV fluid and Toradol her heart rate is now 99. She is afebrile. She is much more comfortable on repeat evaluation. Her CBC shows a white blood cell count of 11. Her chemistry is normal.  Her urinalysis shows no signs of infection. She is not pregnant. Patient declined pelvic exam.  Her CT abdomen pelvis shows a left ovarian cyst and some hemorrhage in the pelvis that is likely secondary to a ruptured ovarian cyst.     I explained this to the patient. She is feeling better. I did recommend a pelvic ultrasound to confirm and rule out torsion as well as a pelvic exam but patient declined both. She states that if that is what shows on the CT scan that she will just follow-up with her OB/GYN. I gave her very strict return precautions. I will send her home with some pain medication. Discharged home in good condition. Given strict return precautions. Clinical Impression:  1. Hemorrhagic cyst of left ovary        Disposition Vitals:  [unfilled], [unfilled], [unfilled], [unfilled]    Disposition referral (if applicable):   Hayley Lovett [address] [place] [address]  [phone]    Schedule an appointment as soon as possible for a visit   If symptoms worsen    [address]  [phone]    If symptoms worsen    Disposition medications (if applicable):  New Prescriptions    No medications on file         (Please note that portions of this note may have been completed with a voice recognition program. Efforts were made to edit the dictations but occasionally words are mis-transcribed.)    MD Diamond Pritchett MD  11/17/22 7590

## 2023-08-23 ENCOUNTER — APPOINTMENT (OUTPATIENT)
Dept: CT IMAGING | Age: 48
End: 2023-08-23
Payer: COMMERCIAL

## 2023-08-23 ENCOUNTER — APPOINTMENT (OUTPATIENT)
Dept: GENERAL RADIOLOGY | Age: 48
End: 2023-08-23
Payer: COMMERCIAL

## 2023-08-23 ENCOUNTER — APPOINTMENT (OUTPATIENT)
Dept: MRI IMAGING | Age: 48
End: 2023-08-23
Payer: COMMERCIAL

## 2023-08-23 ENCOUNTER — HOSPITAL ENCOUNTER (OUTPATIENT)
Age: 48
Setting detail: OBSERVATION
Discharge: HOME | End: 2023-08-23
Attending: EMERGENCY MEDICINE | Admitting: INTERNAL MEDICINE
Payer: COMMERCIAL

## 2023-08-23 VITALS
OXYGEN SATURATION: 99 % | DIASTOLIC BLOOD PRESSURE: 89 MMHG | HEART RATE: 93 BPM | SYSTOLIC BLOOD PRESSURE: 141 MMHG | RESPIRATION RATE: 16 BRPM | BODY MASS INDEX: 29.02 KG/M2 | TEMPERATURE: 98.6 F | WEIGHT: 191.44 LBS | HEIGHT: 68 IN

## 2023-08-23 DIAGNOSIS — G45.9 TIA (TRANSIENT ISCHEMIC ATTACK): Primary | ICD-10-CM

## 2023-08-23 DIAGNOSIS — R20.0 NUMBNESS AND TINGLING IN LEFT ARM: ICD-10-CM

## 2023-08-23 DIAGNOSIS — R20.0 NUMBNESS AND TINGLING OF LEFT LEG: ICD-10-CM

## 2023-08-23 DIAGNOSIS — R20.2 NUMBNESS AND TINGLING OF LEFT SIDE OF FACE: ICD-10-CM

## 2023-08-23 DIAGNOSIS — R20.0 NUMBNESS AND TINGLING OF LEFT SIDE OF FACE: ICD-10-CM

## 2023-08-23 DIAGNOSIS — R20.2 NUMBNESS AND TINGLING OF LEFT LEG: ICD-10-CM

## 2023-08-23 DIAGNOSIS — R20.2 NUMBNESS AND TINGLING IN LEFT ARM: ICD-10-CM

## 2023-08-23 PROBLEM — R29.90 STROKE-LIKE SYMPTOMS: Status: ACTIVE | Noted: 2023-08-23

## 2023-08-23 LAB
ALBUMIN SERPL-MCNC: 4.1 GM/DL (ref 3.4–5)
ALP BLD-CCNC: 70 IU/L (ref 40–129)
ALT SERPL-CCNC: 10 U/L (ref 10–40)
ANION GAP SERPL CALCULATED.3IONS-SCNC: 11 MMOL/L (ref 4–16)
AST SERPL-CCNC: 16 IU/L (ref 15–37)
BASOPHILS ABSOLUTE: 0.1 K/CU MM
BASOPHILS RELATIVE PERCENT: 0.7 % (ref 0–1)
BILIRUB SERPL-MCNC: 0.2 MG/DL (ref 0–1)
BUN SERPL-MCNC: 18 MG/DL (ref 6–23)
CALCIUM SERPL-MCNC: 9.1 MG/DL (ref 8.3–10.6)
CHLORIDE BLD-SCNC: 106 MMOL/L (ref 99–110)
CHOLEST SERPL-MCNC: 177 MG/DL
CHP ED QC CHECK: YES
CO2: 22 MMOL/L (ref 21–32)
CREAT SERPL-MCNC: 1 MG/DL (ref 0.6–1.1)
DIFFERENTIAL TYPE: ABNORMAL
EOSINOPHILS ABSOLUTE: 0.2 K/CU MM
EOSINOPHILS RELATIVE PERCENT: 2.7 % (ref 0–3)
ESTIMATED AVERAGE GLUCOSE: 131 MG/DL
GFR SERPL CREATININE-BSD FRML MDRD: >60 ML/MIN/1.73M2
GLUCOSE BLD-MCNC: 120 MG/DL (ref 70–99)
GLUCOSE BLD-MCNC: 123 MG/DL
GLUCOSE BLD-MCNC: 123 MG/DL (ref 70–99)
GLUCOSE SERPL-MCNC: 108 MG/DL (ref 70–99)
HBA1C MFR BLD: 6.2 % (ref 4.2–6.3)
HCT VFR BLD CALC: 32.3 % (ref 37–47)
HDLC SERPL-MCNC: 49 MG/DL
HEMOGLOBIN: 9.7 GM/DL (ref 12.5–16)
IMMATURE NEUTROPHIL %: 0.3 % (ref 0–0.43)
INR BLD: 1 INDEX
LDLC SERPL CALC-MCNC: 105 MG/DL
LYMPHOCYTES ABSOLUTE: 2 K/CU MM
LYMPHOCYTES RELATIVE PERCENT: 27.1 % (ref 24–44)
MCH RBC QN AUTO: 23.3 PG (ref 27–31)
MCHC RBC AUTO-ENTMCNC: 30 % (ref 32–36)
MCV RBC AUTO: 77.6 FL (ref 78–100)
MONOCYTES ABSOLUTE: 0.8 K/CU MM
MONOCYTES RELATIVE PERCENT: 11.1 % (ref 0–4)
PDW BLD-RTO: 16.5 % (ref 11.7–14.9)
PLATELET # BLD: 294 K/CU MM (ref 140–440)
PMV BLD AUTO: 10.5 FL (ref 7.5–11.1)
POTASSIUM SERPL-SCNC: 4.3 MMOL/L (ref 3.5–5.1)
PROTHROMBIN TIME: 13.7 SECONDS (ref 11.7–14.5)
RBC # BLD: 4.16 M/CU MM (ref 4.2–5.4)
SEGMENTED NEUTROPHILS ABSOLUTE COUNT: 4.4 K/CU MM
SEGMENTED NEUTROPHILS RELATIVE PERCENT: 58.1 % (ref 36–66)
SODIUM BLD-SCNC: 139 MMOL/L (ref 135–145)
TOTAL IMMATURE NEUTOROPHIL: 0.02 K/CU MM
TOTAL PROTEIN: 7.2 GM/DL (ref 6.4–8.2)
TRIGL SERPL-MCNC: 113 MG/DL
TROPONIN T: <0.01 NG/ML
TSH SERPL DL<=0.005 MIU/L-ACNC: 0.98 UIU/ML (ref 0.27–4.2)
WBC # BLD: 7.5 K/CU MM (ref 4–10.5)

## 2023-08-23 PROCEDURE — 80061 LIPID PANEL: CPT

## 2023-08-23 PROCEDURE — 70551 MRI BRAIN STEM W/O DYE: CPT

## 2023-08-23 PROCEDURE — 2580000003 HC RX 258: Performed by: INTERNAL MEDICINE

## 2023-08-23 PROCEDURE — 93005 ELECTROCARDIOGRAM TRACING: CPT | Performed by: EMERGENCY MEDICINE

## 2023-08-23 PROCEDURE — 6370000000 HC RX 637 (ALT 250 FOR IP): Performed by: INTERNAL MEDICINE

## 2023-08-23 PROCEDURE — G0378 HOSPITAL OBSERVATION PER HR: HCPCS

## 2023-08-23 PROCEDURE — 6370000000 HC RX 637 (ALT 250 FOR IP): Performed by: EMERGENCY MEDICINE

## 2023-08-23 PROCEDURE — 85610 PROTHROMBIN TIME: CPT

## 2023-08-23 PROCEDURE — 82962 GLUCOSE BLOOD TEST: CPT

## 2023-08-23 PROCEDURE — 97116 GAIT TRAINING THERAPY: CPT

## 2023-08-23 PROCEDURE — 70498 CT ANGIOGRAPHY NECK: CPT

## 2023-08-23 PROCEDURE — 92610 EVALUATE SWALLOWING FUNCTION: CPT | Performed by: SPEECH-LANGUAGE PATHOLOGIST

## 2023-08-23 PROCEDURE — 80053 COMPREHEN METABOLIC PANEL: CPT

## 2023-08-23 PROCEDURE — 94761 N-INVAS EAR/PLS OXIMETRY MLT: CPT

## 2023-08-23 PROCEDURE — 70450 CT HEAD/BRAIN W/O DYE: CPT

## 2023-08-23 PROCEDURE — 85025 COMPLETE CBC W/AUTO DIFF WBC: CPT

## 2023-08-23 PROCEDURE — 99245 OFF/OP CONSLTJ NEW/EST HI 55: CPT | Performed by: STUDENT IN AN ORGANIZED HEALTH CARE EDUCATION/TRAINING PROGRAM

## 2023-08-23 PROCEDURE — 36415 COLL VENOUS BLD VENIPUNCTURE: CPT

## 2023-08-23 PROCEDURE — 71045 X-RAY EXAM CHEST 1 VIEW: CPT

## 2023-08-23 PROCEDURE — 83036 HEMOGLOBIN GLYCOSYLATED A1C: CPT

## 2023-08-23 PROCEDURE — 99285 EMERGENCY DEPT VISIT HI MDM: CPT

## 2023-08-23 PROCEDURE — 84443 ASSAY THYROID STIM HORMONE: CPT

## 2023-08-23 PROCEDURE — 84484 ASSAY OF TROPONIN QUANT: CPT

## 2023-08-23 PROCEDURE — 6360000004 HC RX CONTRAST MEDICATION: Performed by: EMERGENCY MEDICINE

## 2023-08-23 PROCEDURE — 97162 PT EVAL MOD COMPLEX 30 MIN: CPT

## 2023-08-23 PROCEDURE — 70496 CT ANGIOGRAPHY HEAD: CPT

## 2023-08-23 RX ORDER — ASPIRIN 81 MG/1
81 TABLET, CHEWABLE ORAL DAILY
Status: DISCONTINUED | OUTPATIENT
Start: 2023-08-24 | End: 2023-08-23 | Stop reason: HOSPADM

## 2023-08-23 RX ORDER — ASPIRIN 300 MG/1
300 SUPPOSITORY RECTAL DAILY
Status: DISCONTINUED | OUTPATIENT
Start: 2023-08-24 | End: 2023-08-23 | Stop reason: HOSPADM

## 2023-08-23 RX ORDER — CARVEDILOL 12.5 MG/1
25 TABLET ORAL 2 TIMES DAILY
Qty: 60 TABLET | Refills: 3 | Status: SHIPPED | OUTPATIENT
Start: 2023-08-23

## 2023-08-23 RX ORDER — PROCHLORPERAZINE EDISYLATE 5 MG/ML
10 INJECTION INTRAMUSCULAR; INTRAVENOUS EVERY 6 HOURS PRN
Status: DISCONTINUED | OUTPATIENT
Start: 2023-08-23 | End: 2023-08-23 | Stop reason: HOSPADM

## 2023-08-23 RX ORDER — SODIUM CHLORIDE 0.9 % (FLUSH) 0.9 %
5-40 SYRINGE (ML) INJECTION EVERY 12 HOURS SCHEDULED
Status: DISCONTINUED | OUTPATIENT
Start: 2023-08-23 | End: 2023-08-23 | Stop reason: HOSPADM

## 2023-08-23 RX ORDER — SODIUM CHLORIDE 0.9 % (FLUSH) 0.9 %
5-40 SYRINGE (ML) INJECTION PRN
Status: DISCONTINUED | OUTPATIENT
Start: 2023-08-23 | End: 2023-08-23 | Stop reason: HOSPADM

## 2023-08-23 RX ORDER — CARVEDILOL 6.25 MG/1
12.5 TABLET ORAL 2 TIMES DAILY WITH MEALS
Status: DISCONTINUED | OUTPATIENT
Start: 2023-08-23 | End: 2023-08-23 | Stop reason: HOSPADM

## 2023-08-23 RX ORDER — ENOXAPARIN SODIUM 100 MG/ML
40 INJECTION SUBCUTANEOUS DAILY
Status: DISCONTINUED | OUTPATIENT
Start: 2023-08-23 | End: 2023-08-23 | Stop reason: HOSPADM

## 2023-08-23 RX ORDER — ONDANSETRON 2 MG/ML
4 INJECTION INTRAMUSCULAR; INTRAVENOUS EVERY 6 HOURS PRN
Status: DISCONTINUED | OUTPATIENT
Start: 2023-08-23 | End: 2023-08-23

## 2023-08-23 RX ORDER — CARVEDILOL 12.5 MG/1
1 TABLET ORAL 2 TIMES DAILY
Status: ON HOLD | COMMUNITY
Start: 2023-03-24 | End: 2023-08-23 | Stop reason: SDUPTHER

## 2023-08-23 RX ORDER — ATORVASTATIN CALCIUM 40 MG/1
40 TABLET, FILM COATED ORAL NIGHTLY
Status: DISCONTINUED | OUTPATIENT
Start: 2023-08-23 | End: 2023-08-23 | Stop reason: HOSPADM

## 2023-08-23 RX ORDER — ONDANSETRON 4 MG/1
4 TABLET, ORALLY DISINTEGRATING ORAL EVERY 8 HOURS PRN
Status: DISCONTINUED | OUTPATIENT
Start: 2023-08-23 | End: 2023-08-23

## 2023-08-23 RX ORDER — ATORVASTATIN CALCIUM 40 MG/1
40 TABLET, FILM COATED ORAL NIGHTLY
Qty: 30 TABLET | Refills: 3 | Status: CANCELLED | OUTPATIENT
Start: 2023-08-23

## 2023-08-23 RX ORDER — ASPIRIN 81 MG/1
324 TABLET, CHEWABLE ORAL ONCE
Status: COMPLETED | OUTPATIENT
Start: 2023-08-23 | End: 2023-08-23

## 2023-08-23 RX ORDER — POLYETHYLENE GLYCOL 3350 17 G/17G
17 POWDER, FOR SOLUTION ORAL DAILY PRN
Status: DISCONTINUED | OUTPATIENT
Start: 2023-08-23 | End: 2023-08-23 | Stop reason: HOSPADM

## 2023-08-23 RX ORDER — SODIUM CHLORIDE 9 MG/ML
INJECTION, SOLUTION INTRAVENOUS PRN
Status: DISCONTINUED | OUTPATIENT
Start: 2023-08-23 | End: 2023-08-23 | Stop reason: HOSPADM

## 2023-08-23 RX ADMIN — IOPAMIDOL 100 ML: 755 INJECTION, SOLUTION INTRAVENOUS at 01:50

## 2023-08-23 RX ADMIN — CARVEDILOL 12.5 MG: 6.25 TABLET, FILM COATED ORAL at 07:58

## 2023-08-23 RX ADMIN — ASPIRIN 324 MG: 81 TABLET, CHEWABLE ORAL at 02:39

## 2023-08-23 RX ADMIN — SODIUM CHLORIDE, PRESERVATIVE FREE 10 ML: 5 INJECTION INTRAVENOUS at 08:00

## 2023-08-23 ASSESSMENT — LIFESTYLE VARIABLES
HOW MANY STANDARD DRINKS CONTAINING ALCOHOL DO YOU HAVE ON A TYPICAL DAY: PATIENT DOES NOT DRINK
HOW OFTEN DO YOU HAVE A DRINK CONTAINING ALCOHOL: NEVER

## 2023-08-23 ASSESSMENT — PAIN - FUNCTIONAL ASSESSMENT: PAIN_FUNCTIONAL_ASSESSMENT: NONE - DENIES PAIN

## 2023-08-23 NOTE — PROGRESS NOTES
Physical Therapy  Facility/Department: [facility] OBSERVATION  Physical Therapy Initial Assessment    Name: Patric Araujo  : 1975  MRN: [id number]  Date of Service: 2023    Discharge Recommendations:  24 hour supervision or assist, Home with assist PRN, Outpatient PT        Functional Outcome Measure:    Acute Care Index of Function (ACIF):    Score: 0.94 (0.71 or greater = appropriate for home with OP PT and 24 hr supervision/assist prn)        Patient Diagnosis(es): The primary encounter diagnosis was TIA (transient ischemic attack). Diagnoses of Numbness and tingling of left side of face, Numbness and tingling in left arm, and Numbness and tingling of left leg were also pertinent to this visit. Past Medical History:  has a past medical history of CHF (congestive heart failure) ([address]), Depression, Hypertension, Insomnia, and Kidney stone. Past Surgical History:  has a past surgical history that includes Carpal tunnel release;  section; Tubal ligation; and Tonsillectomy and adenoidectomy. Assessment   Body Structures, Functions, Activity Limitations Requiring Skilled Therapeutic Intervention: Decreased high-level IADLs;Decreased functional mobility ; Decreased endurance;Decreased strength  Therapy Prognosis: Good  Decision Making: Medium Complexity  Clinical Presentation: evolving  Requires PT Follow-Up: Yes  Activity Tolerance  Activity Tolerance: Patient tolerated evaluation without incident     Plan   Physcial Therapy Plan  General Plan: 3-5 times per week  Current Treatment Recommendations: ROM, Balance training, Strengthening, Functional mobility training, Transfer training, ADL/Self-care training, IADL training, Endurance training, Equipment evaluation, education, & procurement, Pain management, Gait training, Stair training, Neuromuscular re-education, Positioning, Home exercise program, Patient/Caregiver education & training, Therapeutic activities, Safety education & training  Safety Devices  Type of Devices:  All fall risk precautions in place, Call light within reach, Gait belt, Nurse notified, Bed alarm in place, Left in bed  Restraints  Restraints Initially in Place: No     Restrictions  Restrictions/Precautions  Restrictions/Precautions: General Precautions     Subjective   General  Chart Reviewed: Yes  Patient assessed for rehabilitation services?: Yes  Family / Caregiver Present: No  Follows Commands: Within Functional Limits  Subjective  Subjective: pain: denies; 0/10         Social/Functional History  Social/Functional History  Type of Home: House  Home Layout: One level  ADL Assistance: Independent  Homemaking Assistance: Independent  Ambulation Assistance: Independent  Transfer Assistance: Independent  Vision/Hearing  Vision  Vision: Within Functional Limits  Hearing  Hearing: Within functional limits    Cognition   Orientation  Overall Orientation Status: Within Functional Limits  Cognition  Overall Cognitive Status: WFL     Objective   Pulse: 93  Heart Rate Source: Monitor  BP: (!) 141/89  BP Location: Right upper arm  BP Method: Automatic  Patient Position: Semi fowlers  MAP (Calculated): 106  Respirations: 16  SpO2: 99 %  O2 Device: None (Room air)        Gross Assessment  Coordination: Within functional limits  Sensation: Intact (BLEs)        Strength RLE  Strength RLE: WFL  Strength LLE  Comment: knee extension: 4/5, ankle DF: 4+/5           Bed mobility  Supine to Sit: Independent  Sit to Supine: Independent  Transfers  Sit to Stand: Independent  Stand to Sit: Independent  Ambulation  Surface: Level tile  Device: No Device  Assistance: Supervision;Stand by assistance  Quality of Gait: decreased gait speed  Distance: 150 feet  Comments: with initial verbal cues for directions in order to successfully navigate hallway and return to correct room     Balance  Posture: Fair  Sitting - Static: Good  Sitting - Dynamic: Good  Standing - Static: Good  Standing - Dynamic: Good

## 2023-08-23 NOTE — PROGRESS NOTES
08/23/23 1633   Encounter Summary   Encounter Overview/Reason  Initial Encounter   Service Provided For: Patient   Referral/Consult From: [address] Family members   Last Encounter  08/23/23  (Patient did not express any needs at the time of visit)   Complexity of Encounter Low   Begin Time 0952   End Time  0958   Total Time Calculated 6 min   Spiritual/Emotional needs   Type Spiritual Support   Grief, Loss, and Adjustments   Type Life Adjustments; Adjustment to illness   Assessment/Intervention/Outcome   Assessment Concerns with suffering; Anxious   Intervention Active listening;Empowerment;Sustaining Presence/Ministry of presence   Outcome Coping;Encouraged;Engaged in conversation;Expressed Gratitude;Receptive   Plan and Referrals   Plan/Referrals No future visits requested

## 2023-08-23 NOTE — ED NOTES
Maureen Dillon was consulted. Dr. Vick Jackman spoke with Neuro and due to pt's symptoms resolving, they are not seeing pt at this time. Pt updated.       Lillie Lowery RN  08/23/23 8936

## 2023-08-23 NOTE — PROGRESS NOTES
SLP ALL NOTES  Facility/Department: [facility] OBSERVATION   CLINICAL BEDSIDE SWALLOW EVALUATION    NAME: Aba Cordero  : 1975  MRN: [id number]    ADMISSION DATE: 2023  ADMITTING DIAGNOSIS: has Insomnia; Shoulder pain; Depression; Hypertension; Nevus of face; Actinic keratosis; Smoker; History of chest pain; Anxiety; and Stroke-like symptoms on their problem list.    Impression  Dysphagia Diagnosis: Swallow function appears WFL;No clinical indicators of dysphagia  Dysphagia Outcome Severity Scale: Level 7: Normal in all situations     Aba Cordero was seen for a clinical bedside swallow evaluation in the ED for stroke like symptoms including left facial, arm and leg numbness and tingling. Pt was alert, pleasant, and cooperative, oriented x 4, and reports her symptoms are resolving. She  denies any difficulty with swallowing. Pt followed all complex commands for the oral motor exam which was normal with no focal weakness. Vocal quality was normal.  PO trials of regular solids and thin liquids completed with normal oropharyngeal swallow. No s/s aspiration, likely normal swallow timing and hyolaryngeal excursion observed. Speech and language screened and judged to be WNL. Recommend:  Regular/Thins. No needs identified at this time. ONSET DATE: 2023     Recent Chest Xray/CT of Chest:  see chart    Date of Eval: 2023  Evaluating Therapist: JERE Soria    Current Diet level:  Current Diet : Regular  Current Liquid Diet : Thin    Primary Complaint  denies    Pain:  Pain Assessment  Pain Assessment: None - Denies Pain    Reason for Referral  Aba Cordero was referred for a bedside swallow evaluation to assess the efficiency of her swallow function, identify signs and symptoms of aspiration and make recommendations regarding safe dietary consistencies, effective compensatory strategies, and safe eating environment.         Treatment Plan  Requires SLP Intervention: No  Duration of Treatment: n/a  D/C Recommendations: No follow up therapy recommended post discharge     Recommended Diet and Intervention  Diet Solids Recommendation: Regular  Liquid Consistency Recommendation: Thin  Recommended Form of Meds: PO  Recommendations: Self feed     Compensatory Swallowing Strategies  Compensatory Swallowing Strategies : Upright as possible for all oral intake    Treatment/Goals  Short-term Goals  Timeframe for Short-term Goals: n/a  Long-term Goals  Timeframe for Long-term Goals: n/a    General  Chart Reviewed: Yes  Behavior/Cognition: Alert; Cooperative;Pleasant mood  Respiratory Status: Room air  O2 Device: None (Room air)  Communication Observation: Functional  Follows Directions: Complex  Dentition: Some missing teeth  Patient Positioning: Upright in bed  Baseline Vocal Quality: Normal  Prior Dysphagia History: none charted  Consistencies Administered: Regular; Thin - cup    Vision/Hearing     Oral Motor Deficits  Labial: No impairment  Oral Hygiene: Moist;Clean  Lingual: No impairment  Velum: Unable to visualize  Mandible: No impairment  Consistencies Administered: Regular; Thin - cup    Oral Phase Dysfunction  Oral Phase  Oral Phase: WNL     Indicators of Pharyngeal Phase Dysfunction   Pharyngeal Phase  Pharyngeal Phase: WNL  Pharyngeal Phase   Pharyngeal Phase: WNL    Prognosis  Prognosis: Excellent  Consulted and agree with results and recommendations: Patient;RN  RN Name: Janice [employer]  Patient Education: results WNL  Patient Education Response: Demonstrated understanding  Safety Devices in place: Yes  Type of devices: All fall risk precautions in place; Left in bed       Therapy Time  In/Out:  [address] J[address]  8/23/2023 11:34 AM

## 2023-08-23 NOTE — DISCHARGE INSTRUCTIONS
Call to schedule follow up hospital follow up appointment:    [address] [facility] [address] at [address]  [address] [facility] [address], [address] [facility] [phone]  [address], [address] [phone]   [address] [facility]     Call for new patient Primary Care Physician appointment:     Physician Finder [phone]    Dr. Ernie Cerrato and Dr. Arjun Reed [phone]  [address] [place] [address], [address] [facility] [phone]  [address] [place], [place]    Dr. Brandyn Lay and Pankaj Vaz [phone]  [address] [facility], [address] [place]    Dr. Yary Pillai and Stephen HARRIS [phone]  [facility], [address] [place] [address] [address]  [address] [facility] Direct Primary Care [phone]   [address]    Dr. Nabil HARRIS and Maureen [address]aide  [address] [phone]  [address] [place] [address] & [facility], [address] [place], [address] [facility] [address] [facility]*) [phone]   Reather Schaumann Dr. Nathen Lamas [phone]  [facility] [address] [facility] [phone]  [address] [place], [place]    Dr. Daniel Jarquin [phone]  [address] [facility], [address] [place]    Dr. Ernie Cerrato and Dr. Carine Hylton [phone]  [address], [address] Zaid HARRIS and [place] [address] [phone]  [address], [address] Meredith Peaks [address], [place] [address] and [place] To [phone]  [address] Zaid Douglas and Rasheeda Gonzales [phone]  [facility] [facility] [address], [address]    Dr. Travis Nielsen, Dr. Denis Sandifer CNP, Carmelita Matamoros [address] [facility] and Chiquita Sandoval [address] [facility]   [phone]  [facility] [address] [facility],First Floor, [address], [place]    Dr. Karena Kiser CNP and Cristofer Rosas [phone]  [address], [address] Elizabeth Davis [place] [phone]  [facility], [address] [place] [address]

## 2023-08-23 NOTE — DISCHARGE SUMMARY
cervical ICAs. VERTEBRAL ARTERIES: No dissection, arterial injury, or significant stenosis. SOFT TISSUES: The lung apices are clear. No cervical or superior mediastinal lymphadenopathy. The larynx and pharynx are unremarkable. No acute abnormality of the salivary and thyroid glands. BONES: No acute osseous abnormality. CTA HEAD: ANTERIOR CIRCULATION: No significant stenosis of the intracranial internal carotid, anterior cerebral, or middle cerebral arteries. No aneurysm. POSTERIOR CIRCULATION: No significant stenosis of the vertebral, basilar, or posterior cerebral arteries. No aneurysm. OTHER: No dural venous sinus thrombosis on this non-dedicated study. 1. Age-indeterminate lacunar infarct right basal ganglia. No acute intracranial abnormality. 2. Unremarkable CTA of the head and neck. CT head findings were communicated to Dr. Dieter Sarabia by the CORE team on 8/23/2023 at 2:05 am.     MRI brain without contrast    Result Date: 8/23/2023  EXAMINATION: MRI OF THE BRAIN WITHOUT CONTRAST  8/23/2023 7:34 am TECHNIQUE: Multiplanar multisequence MRI of the brain was performed without the administration of intravenous contrast. COMPARISON: None HISTORY: ORDERING SYSTEM PROVIDED HISTORY: stroke like symptoms, infarct on CT head TECHNOLOGIST PROVIDED HISTORY: Reason for exam:->stroke like symptoms, infarct on CT head Reason for Exam: stroke like symptoms, infarct on CT head FINDINGS: INTRACRANIAL STRUCTURES/VENTRICLES: There are no areas of restricted diffusion identified to suggest an acute infarct. There is no acute intracranial hemorrhage. No mass effect or midline shift is present. There are multiple foci of abnormal increased T2/FLAIR signal intensity within the periventricular, subcortical and deep white matter of both hemispheres. There is no ventriculomegaly or abnormal extra-axial fluid collection present. The proximal portions of the [place] demonstrate normal flow voids.  ORBITS: Limited evaluation of the orbits is unremarkable. SINUSES: Mild mucosal thickening is present within the paranasal sinuses. The paranasal sinuses and mastoid air cells are otherwise clear. BONES/SOFT TISSUES: Bone marrow signal intensity is normal.     Preliminarily, there is no acute infarct or acute intracranial process identified. The study was interpreted during a hospital down time and interpreted on nondiagnostic software. An addendum will be generated once the study can be interpreted on diagnostic software with appropriate prior studies available. Addendum: The study was reviewed following conclusion of the down time. A prior CT angiogram of the brain and noncontrast CT of the brain from 08/23/2023 were reviewed. There is no acute infarct identified. The findings described within the right basal ganglia on the previous CT are consistent with chronic small vessel ischemic changes. CBC:   Recent Labs     08/23/23 0133   WBC 7.5   HGB 9.7*        BMP:    Recent Labs     08/23/23 0133 08/23/23  0154     --    K 4.3  --      --    CO2 22  --    BUN 18  --    CREATININE 1.0  --    GLUCOSE 108* 123     Hepatic:   Recent Labs     08/23/23 0133   AST 16   ALT 10   BILITOT 0.2   ALKPHOS 70     Lipids:   Lab Results   Component Value Date/Time    CHOL 177 08/23/2023 07:55 AM    HDL 49 08/23/2023 07:55 AM    TRIG 113 08/23/2023 07:55 AM     Hemoglobin A1C:   Lab Results   Component Value Date/Time    LABA1C 6.2 08/23/2023 07:55 AM     TSH: No results found for: TSH  Troponin:   Lab Results   Component Value Date/Time    TROPONINT <0.010 08/23/2023 01:33 AM     Lactic Acid: No results for input(s): LACTA in the last 72 hours. BNP: No results for input(s): PROBNP in the last 72 hours.   UA:  Lab Results   Component Value Date/Time    NITRU NEGATIVE 11/17/2022 06:30 PM    COLORU YELLOW 11/17/2022 06:30 PM    WBCUA 20 10/08/2021 11:05 AM    RBCUA 1 10/08/2021 11:05 AM    BACTERIA FEW 10/08/2021 11:05 AM

## 2023-08-23 NOTE — CONSULTS
Neurology Service Consult Note  [address] [place] [address]   Patient Name: Romeo Bella  : 1975        Subjective:   Reason for consult: stroke like symptoms  57 y.o.  female  with history of CHF depression, HTN, presenting to [address] [place] [address] with complaints of numbness of the left side of the body for 2 weeks. Symptoms have been intermittent over the last 2 weeks but were persistent since yesterday prompting the patient to come in for evaluation. Chart was reviewed in detail, patient was seen and assessed. She is lying in bed sleeping but wakes easily to voice. She describes that her symptoms started 2 weeks ago with dizziness and numbness on the left side of the body. She is able to draw a line from the point of her face across the left side to describe where her sensation change begins. She stated she noticed that her symptoms would come and go and were worse with increased stress. She is struggling with having her parents placed in a facility. She tells me that her symptoms were better when she thought that her parents would have placement however that fell through and caused her significant amount of distress. Last night her symptoms started again about 11 PM.  She tells me that they initially lasted for 5 minutes and then stopped. They returned and persisted for longer. Overnight symptoms again resolved but recurred this morning when she woke up in the hospital.  Patient denies any associated weakness, speech or vision changes along with the sensory changes. She has hypertension, hyperlipidemia and is a current smoker.     Past Medical History:   Diagnosis Date    CHF (congestive heart failure) ([address])     Depression     reports prior hx but no sx at present    Hypertension     diet controlled    Insomnia 2015    Kidney stone     :   Past Surgical History:   Procedure Laterality Date    CARPAL TUNNEL RELEASE       SECTION      TONSILLECTOMY AND ADENOIDECTOMY      TUBAL LIGATION       Medications:  Scheduled Meds:   sodium chloride flush  5-40 mL IntraVENous 2 times per day    enoxaparin  40 mg SubCUTAneous Daily    atorvastatin  40 mg Oral Nightly    [START ON 8/24/2023] aspirin  81 mg Oral Daily    Or    [START ON 8/24/2023] aspirin  300 mg Rectal Daily    carvedilol  12.5 mg Oral BID with meals     Continuous Infusions:   sodium chloride       PRN Meds:.sodium chloride flush, sodium chloride, polyethylene glycol, prochlorperazine    No Known Allergies  Social History     Socioeconomic History    Marital status: Single     Spouse name: Not on file    Number of children: Not on file    Years of education: Not on file    Highest education level: Not on file   Occupational History    Not on file   Tobacco Use    Smoking status: Every Day     Packs/day: 0.50     Types: Cigarettes    Smokeless tobacco: Never   Vaping Use    Vaping Use: Never used   Substance and Sexual Activity    Alcohol use: No     Alcohol/week: 0.0 standard drinks     Comment: rare    Drug use: No    Sexual activity: Yes     Partners: Male   Other Topics Concern    Not on file   Social History Narrative    Not on file     Social Determinants of Health     Financial Resource Strain: Not on file   Food Insecurity: Not on file   Transportation Needs: Not on file   Physical Activity: Not on file   Stress: Not on file   Social Connections: Not on file   Intimate Partner Violence: Not on file   Housing Stability: Not on file      Family History   Problem Relation Age of Onset    High Blood Pressure Mother     High Blood Pressure Father     High Blood Pressure Sister     Breast Cancer Neg Hx     Ovarian Cancer Neg Hx     Colon Cancer Neg Hx     Heart Disease Neg Hx          ROS (10 systems)  In addition to that documented in the HPI above, the additional ROS was obtained:  Constitutional: Denies fevers or chills  Eyes: Denies vision changes  ENMT: Denies sore throat  CV: Denies chest pain  Resp:

## 2023-08-23 NOTE — ED PROVIDER NOTES
EMERGENCY DEPARTMENT ENCOUNTER      CHIEF COMPLAINT:   Left-sided numbness    HPI: Amari Ahumada is a 57 y.o. female who presents to the emergency department, for evaluation of left-sided facial numbness as well as left arm numbness and left leg numbness. The patient states that she was last known well at 12:45 PM.  She states that she has been having intermittent similar episodes for the past 2 weeks. This particular episode just started tonight. She came straight here. She states her left arm feels heavy. She otherwise denies any weakness. She denies any fevers, chills, headache, confusion, blurred vision, slurred speech or any other complaints. REVIEW OF SYSTEMS:   Constitutional:  Denies fever or chills  Eyes:  Denies change in visual acuity  HENT:  Denies nasal congestion or sore throat  Respiratory:  Denies cough or shortness of breath  Cardiovascular:  Denies chest pain or edema  GI:  Denies abdominal pain, nausea, vomiting, bloody stools or diarrhea  Musculoskeletal:  Denies back pain or joint pain  Integument:  Denies rash  Neurologic: See HPI  \"Remaining review of systems reviewed and negative. I have reviewed the nursing triage documentation and agree unless otherwise noted below. \"      PAST MEDICAL HISTORY:   Past Medical History:   Diagnosis Date    CHF (congestive heart failure) ([address])     Depression     reports prior hx but no sx at present    Hypertension     diet controlled    Insomnia 2015    Kidney stone        CURRENT MEDICATIONS:   Home medications reviewed.     SURGICAL HISTORY:   Past Surgical History:   Procedure Laterality Date    CARPAL TUNNEL RELEASE       SECTION      TONSILLECTOMY AND ADENOIDECTOMY      TUBAL LIGATION         FAMILY HISTORY:   Family History   Problem Relation Age of Onset    High Blood Pressure Mother     High Blood Pressure Father     High Blood Pressure Sister     Breast Cancer Neg Hx     Ovarian Cancer Neg Hx     Colon Cancer Neg Hx     Heart She otherwise denies any weakness. She denies any fevers, chills, headache, confusion, blurred vision, slurred speech or any other complaints. History from : Patient    Limitations to history : None    Chronic conditions affecting care:   Past Medical History:   Diagnosis Date    CHF (congestive heart failure) ([address])     Depression     reports prior hx but no sx at present    Hypertension     diet controlled    Insomnia 05/07/2015    Kidney stone        Social Determinants : None    Records Reviewed : None    On exam, the patient is afebrile and nontoxic appearing. She is hypertensive with a known history of hypertension and is asymptomatic. She is otherwise hemodynamically stable. She has decreased sensation on the left side. NIH stroke scale is 1. Stroke alert was called and the patient was taken immediately to CT scan. EKG shows a normal sinus rhythm with no ST elevation or depression. Labs are obtained and a there are no clinically significant lab abnormalities. Chest x-ray is negative. CT head without contrast is negative for acute abnormality. There is an age-indeterminate lacunar infarct in the right basal ganglia. CTA of the head and neck is unremarkable. The patient symptoms resolved. I spoke with the stroke neurologist at [facility], Dr. Gogo Roberts, who states that the patient is not a TNK candidate as her symptoms have resolved. She recommends aspirin and local admission for MRI an further stroke evaluation. The patient was treated with medications as below. Patient was given the following medications:  Medications   aspirin chewable tablet 324 mg (324 mg Oral Given 8/23/23 0239)       Diagnosis and Differential Diagnosis:  I suspect that the patient had a TIA. I have a low suspicion for intracranial hemorrhage, encephalitis, meningitis, large vessel occlusion, cardiac arrhythmia, seizure or status epilepticus.      Disposition Considerations:  I recommended transfer for admission to the

## 2023-08-23 NOTE — PLAN OF CARE
Problem: Discharge Planning  Goal: Discharge to home or other facility with appropriate resources  8/23/2023 1532 by Ras Nelson RN  Outcome: Completed  Flowsheets (Taken 8/23/2023 0800)  Discharge to home or other facility with appropriate resources:   Identify barriers to discharge with patient and caregiver   Arrange for needed discharge resources and transportation as appropriate   Identify discharge learning needs (meds, wound care, etc)   Arrange for interpreters to assist at discharge as needed  8/23/2023 0523 by Ezra Funk RN  Outcome: Progressing  Flowsheets (Taken 8/23/2023 5895)  Discharge to home or other facility with appropriate resources:   Identify barriers to discharge with patient and caregiver   Arrange for needed discharge resources and transportation as appropriate   Identify discharge learning needs (meds, wound care, etc)   Refer to discharge planning if patient needs post-hospital services based on physician order or complex needs related to functional status, cognitive ability or social support system     Problem: Chronic Conditions and Co-morbidities  Goal: Patient's chronic conditions and co-morbidity symptoms are monitored and maintained or improved  Outcome: Completed

## 2023-08-23 NOTE — PROGRESS NOTES
4 Eyes Skin Assessment     NAME:  Patric Araujo  YOB: 1975  MEDICAL RECORD NUMBER:  [id number]    The patient is being assessed for  Admission    I agree that at least one RN has performed a thorough Head to Toe Skin Assessment on the patient. ALL assessment sites listed below have been assessed. Areas assessed by both nurses:    Head, Face, Ears, Shoulders, Back, Chest, Arms, Elbows, Hands, Sacrum. Buttock, Coccyx, Ischium, and Legs. Feet and Heels        Does the Patient have a Wound?  No noted wound(s)       Dimitris Prevention initiated by RN: No  Wound Care Orders initiated by RN: No    Pressure Injury (Stage 3,4, Unstageable, DTI, NWPT, and Complex wounds) if present, place Wound referral order by RN under : No    New Ostomies, if present place, Ostomy referral order under : No     Nurse 1 eSignature: Electronically signed by Brian Zepeda RN on 8/23/23 at 5:32 AM EDT    **SHARE this note so that the co-signing nurse can place an eSignature**    Nurse 2 eSignature: {Esignature:[id number]}

## 2023-08-24 LAB
EKG ATRIAL RATE: 98 BPM
EKG DIAGNOSIS: NORMAL
EKG P AXIS: 73 DEGREES
EKG P-R INTERVAL: 162 MS
EKG Q-T INTERVAL: 400 MS
EKG QRS DURATION: 102 MS
EKG QTC CALCULATION (BAZETT): 510 MS
EKG R AXIS: 44 DEGREES
EKG T AXIS: 68 DEGREES
EKG VENTRICULAR RATE: 98 BPM

## 2023-08-24 PROCEDURE — 93010 ELECTROCARDIOGRAM REPORT: CPT | Performed by: INTERNAL MEDICINE

## 2024-10-29 ENCOUNTER — HOSPITAL ENCOUNTER (EMERGENCY)
Age: 49
Discharge: HOME | End: 2024-10-29
Attending: EMERGENCY MEDICINE
Payer: COMMERCIAL

## 2024-10-29 ENCOUNTER — APPOINTMENT (OUTPATIENT)
Dept: GENERAL RADIOLOGY | Age: 49
End: 2024-10-29
Payer: COMMERCIAL

## 2024-10-29 VITALS
BODY MASS INDEX: 28.04 KG/M2 | HEART RATE: 105 BPM | RESPIRATION RATE: 16 BRPM | DIASTOLIC BLOOD PRESSURE: 102 MMHG | OXYGEN SATURATION: 99 % | WEIGHT: 185 LBS | TEMPERATURE: 97.8 F | HEIGHT: 68 IN | SYSTOLIC BLOOD PRESSURE: 166 MMHG

## 2024-10-29 DIAGNOSIS — S60.211A CONTUSION OF RIGHT WRIST, INITIAL ENCOUNTER: Primary | ICD-10-CM

## 2024-10-29 PROCEDURE — 99283 EMERGENCY DEPT VISIT LOW MDM: CPT

## 2024-10-29 PROCEDURE — 73110 X-RAY EXAM OF WRIST: CPT

## 2024-10-29 ASSESSMENT — PAIN DESCRIPTION - PAIN TYPE: TYPE: ACUTE PAIN

## 2024-10-29 ASSESSMENT — PAIN DESCRIPTION - DESCRIPTORS: DESCRIPTORS: PRESSURE

## 2024-10-29 ASSESSMENT — PAIN SCALES - GENERAL: PAINLEVEL_OUTOF10: 5

## 2024-10-29 ASSESSMENT — PAIN - FUNCTIONAL ASSESSMENT: PAIN_FUNCTIONAL_ASSESSMENT: 0-10

## 2024-10-29 ASSESSMENT — PAIN DESCRIPTION - LOCATION: LOCATION: WRIST

## 2024-10-29 ASSESSMENT — PAIN DESCRIPTION - ORIENTATION: ORIENTATION: RIGHT

## 2024-10-29 NOTE — ED PROVIDER NOTES
Concern    Not on file   Social History Narrative    Not on file     Social Determinants of Health     Financial Resource Strain: Not on file   Food Insecurity: Not on file   Transportation Needs: Not on file   Physical Activity: Not on file   Stress: Not on file   Social Connections: Not on file   Intimate Partner Violence: Not on file   Housing Stability: Not on file     No current facility-administered medications for this encounter.     Current Outpatient Medications   Medication Sig Dispense Refill    carvedilol (COREG) 12.5 MG tablet Take 2 tablets by mouth 2 times daily 60 tablet 3     No Known Allergies    Nursing Notes Reviewed    Physical Exam:  ED Triage Vitals [10/29/24 1705]   Encounter Vitals Group      BP (!) 166/102      Systolic BP Percentile       Diastolic BP Percentile       Pulse (!) 105      Respirations 16      Temp 97.8 °F (36.6 °C)      Temp Source Tympanic      SpO2 99 %      Weight - Scale 83.9 kg (185 lb)      Height 1.727 m (5' 8\")      Head Circumference       Peak Flow       Pain Score       Pain Loc       Pain Education       Exclude from Growth Chart            My pulse ox interpretation is - normal    General appearance:  No acute distress.   Skin:  Warm. Dry.   Eye:  Extraocular movements intact.     Ears, nose, mouth and throat:  Oral mucosa moist   Neck:  Trachea midline.   Extremity: Mild swelling over the ulnar process on dorsal aspect with small bruise, she is able to flex and extend at the wrist but does complain of pain.  Does have a scar over the dorsal mid wrist and hand that appears well-healed  Heart:  Regular rate and rhythm  Perfusion:  intact  Respiratory:  Respirations nonlabored.     Abdominal:   Non distended.  Neurological:  Alert and oriented          Psychiatric:  Appropriate    I have reviewed and interpreted all of the currently available lab results from this visit (if applicable):  No results found for this visit on 10/29/24.   Radiographs (if

## 2024-10-29 NOTE — ED NOTES
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 Right wrist pain and swelling. Prior surgery to wrist. Pt is ami dexterous

## 2025-02-22 ENCOUNTER — HOSPITAL ENCOUNTER (EMERGENCY)
Age: 50
Discharge: HOME | End: 2025-02-23
Attending: EMERGENCY MEDICINE
Payer: COMMERCIAL

## 2025-02-22 ENCOUNTER — APPOINTMENT (OUTPATIENT)
Dept: GENERAL RADIOLOGY | Age: 50
End: 2025-02-22
Payer: COMMERCIAL

## 2025-02-22 VITALS
RESPIRATION RATE: 18 BRPM | DIASTOLIC BLOOD PRESSURE: 113 MMHG | TEMPERATURE: 97.2 F | BODY MASS INDEX: 28.04 KG/M2 | HEART RATE: 92 BPM | HEIGHT: 68 IN | WEIGHT: 185 LBS | OXYGEN SATURATION: 100 % | SYSTOLIC BLOOD PRESSURE: 194 MMHG

## 2025-02-22 DIAGNOSIS — S51.011A LACERATION OF RIGHT ELBOW, INITIAL ENCOUNTER: Primary | ICD-10-CM

## 2025-02-22 PROCEDURE — 6370000000 HC RX 637 (ALT 250 FOR IP): Performed by: EMERGENCY MEDICINE

## 2025-02-22 PROCEDURE — 12001 RPR S/N/AX/GEN/TRNK 2.5CM/<: CPT

## 2025-02-22 PROCEDURE — 73080 X-RAY EXAM OF ELBOW: CPT

## 2025-02-22 PROCEDURE — 99283 EMERGENCY DEPT VISIT LOW MDM: CPT

## 2025-02-22 RX ADMIN — Medication 3 ML: at 22:52

## 2025-02-22 ASSESSMENT — PAIN DESCRIPTION - LOCATION: LOCATION: ARM;ELBOW

## 2025-02-22 ASSESSMENT — PAIN SCALES - GENERAL: PAINLEVEL_OUTOF10: 5

## 2025-02-22 ASSESSMENT — PAIN - FUNCTIONAL ASSESSMENT: PAIN_FUNCTIONAL_ASSESSMENT: 0-10

## 2025-02-22 ASSESSMENT — PAIN DESCRIPTION - PAIN TYPE: TYPE: ACUTE PAIN

## 2025-02-22 ASSESSMENT — PAIN DESCRIPTION - ORIENTATION: ORIENTATION: RIGHT

## 2025-02-23 NOTE — DISCHARGE INSTRUCTIONS
Your x-rays today did not show any fracture.    Keep your skin clean and dry where you have the wound that was glued.  Try to keep your arm straight to promote healing.    If you have any issues with wound healing please follow-up with wound care.      If you develop any worsening or concerning symptoms please seek immediate medical attention.

## 2025-02-23 NOTE — ED PROVIDER NOTES
[facility] EMERGENCY DEPARTMENT  EMERGENCY DEPARTMENT ENCOUNTER      Pt Name: Suzie Brown  MRN: [id number]  Birthdate 1975  Date of evaluation: 2/22/2025  Provider: Jennifer Ladd MD    CHIEF COMPLAINT       Chief Complaint   Patient presents with    Arm Injury     Pt fell on ice and has wound to right elbow with pain 5/10          HISTORY OF PRESENT ILLNESS      Suzie Brown is a 49 y.o. female who presents to the emergency department  for   Chief Complaint   Patient presents with    Arm Injury     Pt fell on ice and has wound to right elbow with pain 5/10        49-year-old female presents with right elbow injury.  She is left arm dominant.  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  She comes in moving her elbow well but does have a laceration over the elbow.  She denies any other injuries.  She is not anticoagulated.  Denies any weakness or tingling in the right arm.          Nursing Notes, Triage Notes & Vital Signs were reviewed.      REVIEW OF SYSTEMS    (2-9 systems for level 4, 10 or more for level 5)     Review of Systems   Skin:  Positive for wound.       Except as noted above the remainder of the review of systems was reviewed and negative.       PAST MEDICAL HISTORY     Past Medical History:   Diagnosis Date    CHF (congestive heart failure) (HCC)     Depression     reports prior hx but no sx at present    Hypertension     diet controlled    Insomnia 05/07/2015    Kidney stone        Prior to Admission medications    Medication Sig Start Date End Date Taking? Authorizing Provider   carvedilol (COREG) 12.5 MG tablet Take 2 tablets by mouth 2 times daily 8/23/23   Aicha Capone PA-C        Patient Active Problem List   Diagnosis    Insomnia    Shoulder pain    Depression    Hypertension    Nevus of face    Actinic keratosis    Smoker    History of chest pain    Anxiety    Stroke-like symptoms         SURGICAL HISTORY       Past

## 2025-03-30 ENCOUNTER — HOSPITAL ENCOUNTER (EMERGENCY)
Age: 50
Discharge: HOME | End: 2025-03-30
Attending: STUDENT IN AN ORGANIZED HEALTH CARE EDUCATION/TRAINING PROGRAM
Payer: COMMERCIAL

## 2025-03-30 VITALS
OXYGEN SATURATION: 100 % | BODY MASS INDEX: 27.37 KG/M2 | DIASTOLIC BLOOD PRESSURE: 96 MMHG | TEMPERATURE: 97.9 F | WEIGHT: 180 LBS | SYSTOLIC BLOOD PRESSURE: 170 MMHG | RESPIRATION RATE: 16 BRPM | HEART RATE: 96 BPM

## 2025-03-30 DIAGNOSIS — L03.113 CELLULITIS OF RIGHT UPPER EXTREMITY: Primary | ICD-10-CM

## 2025-03-30 PROCEDURE — 99283 EMERGENCY DEPT VISIT LOW MDM: CPT

## 2025-03-30 PROCEDURE — 6370000000 HC RX 637 (ALT 250 FOR IP): Performed by: STUDENT IN AN ORGANIZED HEALTH CARE EDUCATION/TRAINING PROGRAM

## 2025-03-30 RX ORDER — SULFAMETHOXAZOLE AND TRIMETHOPRIM 800; 160 MG/1; MG/1
1 TABLET ORAL ONCE
Status: COMPLETED | OUTPATIENT
Start: 2025-03-30 | End: 2025-03-30

## 2025-03-30 RX ORDER — CEPHALEXIN 500 MG/1
500 CAPSULE ORAL 4 TIMES DAILY
Qty: 28 CAPSULE | Refills: 0 | Status: SHIPPED | OUTPATIENT
Start: 2025-03-30 | End: 2025-04-06

## 2025-03-30 RX ORDER — SULFAMETHOXAZOLE AND TRIMETHOPRIM 800; 160 MG/1; MG/1
1 TABLET ORAL 2 TIMES DAILY
Qty: 14 TABLET | Refills: 0 | Status: SHIPPED | OUTPATIENT
Start: 2025-03-30 | End: 2025-04-06

## 2025-03-30 RX ADMIN — SULFAMETHOXAZOLE AND TRIMETHOPRIM 1 TABLET: 800; 160 TABLET ORAL at 17:48

## 2025-03-30 RX ADMIN — CEPHALEXIN 500 MG: 250 CAPSULE ORAL at 17:47

## 2025-03-30 ASSESSMENT — PAIN DESCRIPTION - ORIENTATION: ORIENTATION: RIGHT

## 2025-03-30 ASSESSMENT — PAIN DESCRIPTION - DESCRIPTORS: DESCRIPTORS: ACHING

## 2025-03-30 ASSESSMENT — PAIN SCALES - GENERAL: PAINLEVEL_OUTOF10: 3

## 2025-03-30 ASSESSMENT — PAIN - FUNCTIONAL ASSESSMENT: PAIN_FUNCTIONAL_ASSESSMENT: 0-10

## 2025-03-30 ASSESSMENT — PAIN DESCRIPTION - LOCATION: LOCATION: ELBOW

## 2025-03-30 NOTE — ED PROVIDER NOTES
neurovascular intact distally.  I did recommend placing an IV, checking laboratory evaluation, inflammatory markers as well as a CT scan of the elbow itself to evaluate extent of the underlying infection out of potential concerns for joint involvement.  Despite my recommendation, patient did not want to pursue this.  Patient states she will trial antibiotics first, warm compresses and close monitoring and return if her symptoms do not improve.  I states this is not ideal but patient understands that I cannot exclude a deep infection or an infection within the joint without obtaining the imaging.  My suspicion for septic joint is less likely given that she has no pain with active or passive range of motion she has no systemic symptoms but once again cannot exclude communication with the bursa itself.    Patient is aware of this.  Will initiate Bactrim, Keflex, first dose here in the emergency department as well as appropriate dressing changes and wound care.  Patient agreeable with this plan.  Hemodynamically stable and amatory the time of discharge.           EKG (if obtained): (All EKG's are interpreted by myself in the absence of a cardiologist) none        Physical Exam:   Patient Vitals for the past 24 hrs:   BP Temp Temp src Pulse Resp SpO2 Weight   03/30/25 1654 (!) 170/96 97.9 °F (36.6 °C) Oral 96 16 100 % 81.6 kg (180 lb)       Physical Exam  Vitals reviewed.   HENT:      Head: Normocephalic and atraumatic.      Right Ear: External ear normal.      Left Ear: External ear normal.   Eyes:      General: Lids are normal.   Cardiovascular:      Rate and Rhythm: Normal rate and regular rhythm.      Pulses:           Radial pulses are 2+ on the right side and 2+ on the left side.        Dorsalis pedis pulses are 2+ on the right side and 2+ on the left side.      Heart sounds: Normal heart sounds.   Pulmonary:      Effort: No accessory muscle usage or respiratory distress.   Abdominal:      Palpations: Abdomen is

## 2025-04-16 ENCOUNTER — HOSPITAL ENCOUNTER (EMERGENCY)
Age: 50
Discharge: HOME | End: 2025-04-16
Attending: STUDENT IN AN ORGANIZED HEALTH CARE EDUCATION/TRAINING PROGRAM
Payer: COMMERCIAL

## 2025-04-16 VITALS
SYSTOLIC BLOOD PRESSURE: 196 MMHG | TEMPERATURE: 97.8 F | OXYGEN SATURATION: 100 % | BODY MASS INDEX: 28.19 KG/M2 | HEIGHT: 68 IN | DIASTOLIC BLOOD PRESSURE: 115 MMHG | WEIGHT: 186 LBS | RESPIRATION RATE: 18 BRPM | HEART RATE: 106 BPM

## 2025-04-16 DIAGNOSIS — H65.92 LEFT NON-SUPPURATIVE OTITIS MEDIA: Primary | ICD-10-CM

## 2025-04-16 PROCEDURE — 99283 EMERGENCY DEPT VISIT LOW MDM: CPT

## 2025-04-16 RX ORDER — AMOXICILLIN 875 MG/1
875 TABLET, COATED ORAL 2 TIMES DAILY
Qty: 14 TABLET | Refills: 0 | Status: SHIPPED | OUTPATIENT
Start: 2025-04-16 | End: 2025-04-23

## 2025-04-16 ASSESSMENT — PAIN SCALES - GENERAL: PAINLEVEL_OUTOF10: 0

## 2025-04-16 ASSESSMENT — LIFESTYLE VARIABLES
HOW OFTEN DO YOU HAVE A DRINK CONTAINING ALCOHOL: NEVER
HOW MANY STANDARD DRINKS CONTAINING ALCOHOL DO YOU HAVE ON A TYPICAL DAY: PATIENT DOES NOT DRINK

## 2025-04-16 NOTE — ED PROVIDER NOTES
EMERGENCY DEPARTMENT HISTORY AND PHYSICAL EXAM      Patient Name: Suzie Brown  MRN: [id number]  : 1975  Date of Evaluation: 2025  ED Provider:  Derik Good DO    History of Presenting Illness     Chief Complaint:   Chief Complaint   Patient presents with    Ear Fullness     Pt states having left ear fullness and decreased hearing 3 days.       HPI: Patient is a 49 y.o. female with no significant past medical history presenting the emergency department the chief complaint of difficulty hearing.  Patient states she has had mild discomfort in fullness in her left ear over the last 3 days and associated decrease in hearing.  Patient states he has been using Debrox multiple times.  Patient Nuys fevers or chills.  Patient denies chest pain, shortness of breath, cough, nausea, vomit, diarrhea, dysuria.  Patient has an appointment scheduled with her PCP tomorrow.          Past History     Past Medical History:   Past Medical History:   Diagnosis Date    CHF (congestive heart failure) (HCC)     Depression     reports prior hx but no sx at present    Hypertension     diet controlled    Insomnia 2015    Kidney stone      Surgical History:   Past Surgical History:   Procedure Laterality Date    CARPAL TUNNEL RELEASE       SECTION      TONSILLECTOMY AND ADENOIDECTOMY      TUBAL LIGATION       Family History:   Family History   Problem Relation Age of Onset    High Blood Pressure Mother     High Blood Pressure Father     High Blood Pressure Sister     Breast Cancer Neg Hx     Ovarian Cancer Neg Hx     Colon Cancer Neg Hx     Heart Disease Neg Hx      Social History:   Social History     Socioeconomic History    Marital status: Single     Spouse name: Not on file    Number of children: Not on file    Years of education: Not on file    Highest education level: Not on file   Occupational History    Not on file   Tobacco Use    Smoking status: Every Day     Current packs/day: 0.50     Types:

## 2025-04-16 NOTE — DISCHARGE INSTRUCTIONS
Go to scheduled follow-up appointment with your primary care provider tomorrow.  Return emergency department if you develop new or worsening symptoms.